# Patient Record
Sex: FEMALE | Race: WHITE | Employment: OTHER | URBAN - METROPOLITAN AREA
[De-identification: names, ages, dates, MRNs, and addresses within clinical notes are randomized per-mention and may not be internally consistent; named-entity substitution may affect disease eponyms.]

---

## 2017-01-25 DIAGNOSIS — J44.9 COPD WITH ASTHMA (HCC): Primary | ICD-10-CM

## 2017-01-25 RX ORDER — ALBUTEROL SULFATE 90 UG/1
2 AEROSOL, METERED RESPIRATORY (INHALATION) EVERY 6 HOURS PRN
Qty: 1 INHALER | Refills: 0 | OUTPATIENT
Start: 2017-01-25 | End: 2018-03-27

## 2017-01-25 NOTE — TELEPHONE ENCOUNTER
1 ventolin inhaler okayed by Dr Anay Diop.   Left a message to inform patient and she needs to schedule an appointment

## 2017-01-31 ENCOUNTER — OFFICE VISIT (OUTPATIENT)
Dept: INTERNAL MEDICINE CLINIC | Facility: CLINIC | Age: 82
End: 2017-01-31

## 2017-01-31 VITALS
BODY MASS INDEX: 23.83 KG/M2 | RESPIRATION RATE: 16 BRPM | DIASTOLIC BLOOD PRESSURE: 60 MMHG | SYSTOLIC BLOOD PRESSURE: 140 MMHG | WEIGHT: 119.81 LBS | HEART RATE: 72 BPM | HEIGHT: 59.54 IN | OXYGEN SATURATION: 94 %

## 2017-01-31 DIAGNOSIS — J44.9 CHRONIC OBSTRUCTIVE PULMONARY DISEASE, UNSPECIFIED COPD TYPE (HCC): ICD-10-CM

## 2017-01-31 DIAGNOSIS — I73.9 PVD (PERIPHERAL VASCULAR DISEASE) (HCC): ICD-10-CM

## 2017-01-31 DIAGNOSIS — I47.1 PSVT (PAROXYSMAL SUPRAVENTRICULAR TACHYCARDIA) (HCC): ICD-10-CM

## 2017-01-31 DIAGNOSIS — I10 ESSENTIAL HYPERTENSION: Primary | ICD-10-CM

## 2017-01-31 LAB
ALBUMIN SERPL-MCNC: 3.6 G/DL (ref 3.5–4.8)
ALP LIVER SERPL-CCNC: 84 U/L (ref 55–142)
ALT SERPL-CCNC: 25 U/L (ref 14–54)
AST SERPL-CCNC: 23 U/L (ref 15–41)
BILIRUB SERPL-MCNC: 0.4 MG/DL (ref 0.1–2)
BUN BLD-MCNC: 24 MG/DL (ref 8–20)
CALCIUM BLD-MCNC: 10.1 MG/DL (ref 8.3–10.3)
CHLORIDE: 99 MMOL/L (ref 101–111)
CO2: 30 MMOL/L (ref 22–32)
CREAT BLD-MCNC: 1.11 MG/DL (ref 0.55–1.02)
GLUCOSE BLD-MCNC: 112 MG/DL (ref 70–99)
M PROTEIN MFR SERPL ELPH: 7.4 G/DL (ref 6.1–8.3)
POTASSIUM SERPL-SCNC: 4.6 MMOL/L (ref 3.6–5.1)
SODIUM SERPL-SCNC: 137 MMOL/L (ref 136–144)

## 2017-01-31 PROCEDURE — 36415 COLL VENOUS BLD VENIPUNCTURE: CPT | Performed by: INTERNAL MEDICINE

## 2017-01-31 PROCEDURE — 80053 COMPREHEN METABOLIC PANEL: CPT | Performed by: INTERNAL MEDICINE

## 2017-01-31 PROCEDURE — 99214 OFFICE O/P EST MOD 30 MIN: CPT | Performed by: INTERNAL MEDICINE

## 2017-01-31 NOTE — PROGRESS NOTES
Problem #1 hypertension. Patient denies headaches dizziness chest pain shortness of breath. Problem #2 chronic obstructive pulmonary disease. Patient denies shortness of breath cough or wheezing. O2 sats at rest in the 90s.   With ambulation after 4 min

## 2017-03-20 ENCOUNTER — OFFICE VISIT (OUTPATIENT)
Dept: INTERNAL MEDICINE CLINIC | Facility: CLINIC | Age: 82
End: 2017-03-20

## 2017-03-20 VITALS
SYSTOLIC BLOOD PRESSURE: 130 MMHG | BODY MASS INDEX: 24 KG/M2 | WEIGHT: 118.81 LBS | HEART RATE: 76 BPM | DIASTOLIC BLOOD PRESSURE: 70 MMHG | TEMPERATURE: 99 F | OXYGEN SATURATION: 99 % | RESPIRATION RATE: 16 BRPM

## 2017-03-20 DIAGNOSIS — J44.9 CHRONIC OBSTRUCTIVE PULMONARY DISEASE, UNSPECIFIED COPD TYPE (HCC): ICD-10-CM

## 2017-03-20 DIAGNOSIS — I73.9 PVD (PERIPHERAL VASCULAR DISEASE) (HCC): ICD-10-CM

## 2017-03-20 DIAGNOSIS — I10 ESSENTIAL HYPERTENSION: ICD-10-CM

## 2017-03-20 DIAGNOSIS — I47.1 PSVT (PAROXYSMAL SUPRAVENTRICULAR TACHYCARDIA) (HCC): ICD-10-CM

## 2017-03-20 DIAGNOSIS — Z01.818 PRE-OP EXAM: Primary | ICD-10-CM

## 2017-03-20 DIAGNOSIS — H25.9 AGE-RELATED CATARACT OF BOTH EYES, UNSPECIFIED AGE-RELATED CATARACT TYPE: ICD-10-CM

## 2017-03-20 PROCEDURE — 99214 OFFICE O/P EST MOD 30 MIN: CPT | Performed by: INTERNAL MEDICINE

## 2017-03-20 RX ORDER — OFLOXACIN 3 MG/ML
SOLUTION/ DROPS OPHTHALMIC
COMMUNITY
Start: 2017-02-20 | End: 2017-06-19

## 2017-03-20 RX ORDER — PREDNISOLONE ACETATE 10 MG/ML
SUSPENSION/ DROPS OPHTHALMIC
COMMUNITY
Start: 2017-02-20 | End: 2017-12-22

## 2017-03-20 NOTE — PROGRESS NOTES
Patient is here for preop evaluation. She has age-related cataracts. 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 She also has COPD. She denies cough shortness of breath or wheezing. Her eye blood pressure is well controlled.

## 2017-04-28 ENCOUNTER — APPOINTMENT (OUTPATIENT)
Dept: LAB | Facility: HOSPITAL | Age: 82
End: 2017-04-28
Payer: MEDICARE

## 2017-04-28 DIAGNOSIS — I10 ESSENTIAL HYPERTENSION: ICD-10-CM

## 2017-04-28 PROCEDURE — 80048 BASIC METABOLIC PNL TOTAL CA: CPT

## 2017-04-28 PROCEDURE — 93010 ELECTROCARDIOGRAM REPORT: CPT | Performed by: INTERNAL MEDICINE

## 2017-04-28 PROCEDURE — 93005 ELECTROCARDIOGRAM TRACING: CPT

## 2017-04-28 PROCEDURE — 36415 COLL VENOUS BLD VENIPUNCTURE: CPT

## 2017-04-28 NOTE — PROGRESS NOTES
Received a call from her retinal specialist Dr. Kendrick Garcia. He needs to do surgery on her. He states that her recent H&P done for cataract surgery would be sufficient. We will have this faxed to his office.

## 2017-05-01 NOTE — INTERVAL H&P NOTE
Pre-op Diagnosis: LENS FRAGMENTS RIGHT EYE    The above referenced H&P was reviewed by Karen Nichols MD on 5/1/2017, the patient was examined and no significant changes have occurred in the patient's condition since the H&P was performed.   I discussed with

## 2017-05-02 ENCOUNTER — SURGERY (OUTPATIENT)
Age: 82
End: 2017-05-02

## 2017-05-02 ENCOUNTER — HOSPITAL ENCOUNTER (OUTPATIENT)
Facility: HOSPITAL | Age: 82
Setting detail: HOSPITAL OUTPATIENT SURGERY
Discharge: HOME | End: 2017-05-02
Attending: OPHTHALMOLOGY | Admitting: OPHTHALMOLOGY
Payer: MEDICARE

## 2017-05-02 VITALS
TEMPERATURE: 98 F | OXYGEN SATURATION: 93 % | BODY MASS INDEX: 22.58 KG/M2 | HEIGHT: 60 IN | HEART RATE: 93 BPM | RESPIRATION RATE: 16 BRPM | SYSTOLIC BLOOD PRESSURE: 103 MMHG | WEIGHT: 115 LBS | DIASTOLIC BLOOD PRESSURE: 56 MMHG

## 2017-05-02 DIAGNOSIS — I10 ESSENTIAL HYPERTENSION: Primary | ICD-10-CM

## 2017-05-02 PROBLEM — H59.021 CATARACT (LENS) FRAGMENTS IN EYE FOLLOWING CATARACT SURGERY, RIGHT EYE: Status: ACTIVE | Noted: 2017-05-02

## 2017-05-02 PROCEDURE — 08B43ZZ EXCISION OF RIGHT VITREOUS, PERCUTANEOUS APPROACH: ICD-10-PCS | Performed by: OPHTHALMOLOGY

## 2017-05-02 PROCEDURE — 08DJ3ZZ EXTRACTION OF RIGHT LENS, PERCUTANEOUS APPROACH: ICD-10-PCS | Performed by: OPHTHALMOLOGY

## 2017-05-02 RX ORDER — PHENYLEPHRINE HCL 2.5 %
1 DROPS OPHTHALMIC (EYE)
Status: COMPLETED | OUTPATIENT
Start: 2017-05-02 | End: 2017-05-02

## 2017-05-02 RX ORDER — NALOXONE HYDROCHLORIDE 0.4 MG/ML
80 INJECTION, SOLUTION INTRAMUSCULAR; INTRAVENOUS; SUBCUTANEOUS AS NEEDED
Status: DISCONTINUED | OUTPATIENT
Start: 2017-05-02 | End: 2017-05-02

## 2017-05-02 RX ORDER — HYDROMORPHONE HYDROCHLORIDE 1 MG/ML
0.4 INJECTION, SOLUTION INTRAMUSCULAR; INTRAVENOUS; SUBCUTANEOUS EVERY 5 MIN PRN
Status: DISCONTINUED | OUTPATIENT
Start: 2017-05-02 | End: 2017-05-02

## 2017-05-02 RX ORDER — SODIUM CHLORIDE, SODIUM LACTATE, POTASSIUM CHLORIDE, CALCIUM CHLORIDE 600; 310; 30; 20 MG/100ML; MG/100ML; MG/100ML; MG/100ML
INJECTION, SOLUTION INTRAVENOUS CONTINUOUS
Status: DISCONTINUED | OUTPATIENT
Start: 2017-05-02 | End: 2017-05-02

## 2017-05-02 RX ORDER — MEPERIDINE HYDROCHLORIDE 25 MG/ML
12.5 INJECTION INTRAMUSCULAR; INTRAVENOUS; SUBCUTANEOUS AS NEEDED
Status: DISCONTINUED | OUTPATIENT
Start: 2017-05-02 | End: 2017-05-02

## 2017-05-02 RX ORDER — ACETAMINOPHEN 500 MG
1000 TABLET ORAL ONCE AS NEEDED
Status: COMPLETED | OUTPATIENT
Start: 2017-05-02 | End: 2017-05-02

## 2017-05-02 RX ORDER — METOCLOPRAMIDE HYDROCHLORIDE 5 MG/ML
10 INJECTION INTRAMUSCULAR; INTRAVENOUS AS NEEDED
Status: DISCONTINUED | OUTPATIENT
Start: 2017-05-02 | End: 2017-05-02

## 2017-05-02 RX ORDER — OFLOXACIN 3 MG/ML
1 SOLUTION/ DROPS OPHTHALMIC ONCE
Status: COMPLETED | OUTPATIENT
Start: 2017-05-02 | End: 2017-05-02

## 2017-05-02 RX ORDER — CYCLOPENTOLATE HYDROCHLORIDE 10 MG/ML
1 SOLUTION/ DROPS OPHTHALMIC
Status: COMPLETED | OUTPATIENT
Start: 2017-05-02 | End: 2017-05-02

## 2017-05-02 RX ORDER — FLURBIPROFEN SODIUM 0.3 MG/ML
1 SOLUTION/ DROPS OPHTHALMIC
Status: COMPLETED | OUTPATIENT
Start: 2017-05-02 | End: 2017-05-02

## 2017-05-02 RX ORDER — ONDANSETRON 2 MG/ML
4 INJECTION INTRAMUSCULAR; INTRAVENOUS AS NEEDED
Status: DISCONTINUED | OUTPATIENT
Start: 2017-05-02 | End: 2017-05-02

## 2017-05-02 RX ORDER — ATROPINE SULFATE 10 MG/ML
1 SOLUTION/ DROPS OPHTHALMIC ONCE
Status: COMPLETED | OUTPATIENT
Start: 2017-05-02 | End: 2017-05-02

## 2017-05-02 NOTE — BRIEF OP NOTE
Pre-Operative Diagnosis: LENS FRAGMENTS RIGHT EYE     Post-Operative Diagnosis: LENS FRAGMENTS RIGHT EYE     Procedure Performed:   Procedure(s):  PARS PLANA 23G VITRECTOMY,LENSECTOMY RIGHT EYE    Surgeon(s) and Role:     Td Vazquez MD - Primary

## 2017-05-02 NOTE — OR NURSING
Discharge instructions discussed with patient and daughter, verbalized understanding. Daughter states she has eye drops and is aware how to take them.

## 2017-05-03 NOTE — OPERATIVE REPORT
[facility]    PATIENT'S NAME: Rocio Reeves   ATTENDING PHYSICIAN: Nereida Sosa M.D. OPERATING PHYSICIAN: Nereida Sosa M.D.    PATIENT ACCOUNT#:   [de-identified]    [facility]  MEDICAL RECORD #:   [id number]       ANAYELI that had been given to them at great length. She has been using preoperative antibiotics to her right eye prior to her eye surgery, also with her Simbrinza or antihypertensive eye drops for this right eye.   She has also been wearing an eye shield for prot with the [facility] 83 system. This was a 23-gauge system used. Central core vitrectomy was then done and completed. Viewing posteriorly, there were some remnants that appear to be more cortex-like.   These were slowly removed piece by piece and in sections  Aft into the eye partially using a soft-tipped cannula. It also removed the small hemorrhage over the macular area from the enlarging 10 o'clock site. No new bleeding was noted and cautery was then used.   The wound was tight and also the corneal wounds wer number of surgeries to this eye.      Dictated By Nereida Sosa M.D.  d: 05/02/2017 09:40:38  t: 05/02/2017 10:41:49  [facility] [id number]  [facility]/

## 2017-06-19 PROBLEM — R23.8 EASY BRUISING: Status: ACTIVE | Noted: 2017-06-19

## 2017-06-19 PROBLEM — R23.3 EASY BRUISING: Status: ACTIVE | Noted: 2017-06-19

## 2017-06-19 PROBLEM — R41.0 CONFUSION: Status: ACTIVE | Noted: 2017-06-19

## 2017-06-19 PROBLEM — R09.89 RALES: Status: ACTIVE | Noted: 2017-06-19

## 2017-06-19 PROBLEM — R63.4 WEIGHT LOSS: Status: ACTIVE | Noted: 2017-06-19

## 2017-06-21 ENCOUNTER — TELEPHONE (OUTPATIENT)
Dept: INTERNAL MEDICINE CLINIC | Facility: CLINIC | Age: 82
End: 2017-06-21

## 2017-07-14 ENCOUNTER — OFFICE VISIT (OUTPATIENT)
Dept: INTERNAL MEDICINE CLINIC | Facility: CLINIC | Age: 82
End: 2017-07-14

## 2017-07-14 VITALS
DIASTOLIC BLOOD PRESSURE: 54 MMHG | HEART RATE: 50 BPM | BODY MASS INDEX: 22 KG/M2 | RESPIRATION RATE: 18 BRPM | WEIGHT: 112.69 LBS | OXYGEN SATURATION: 92 % | SYSTOLIC BLOOD PRESSURE: 100 MMHG | TEMPERATURE: 98 F

## 2017-07-14 DIAGNOSIS — E83.52 HYPERCALCEMIA: ICD-10-CM

## 2017-07-14 DIAGNOSIS — N18.30 CKD (CHRONIC KIDNEY DISEASE) STAGE 3, GFR 30-59 ML/MIN (HCC): Primary | ICD-10-CM

## 2017-07-14 DIAGNOSIS — R63.4 WEIGHT LOSS: ICD-10-CM

## 2017-07-14 DIAGNOSIS — R73.9 HYPERGLYCEMIA: ICD-10-CM

## 2017-07-14 LAB
EST. AVERAGE GLUCOSE BLD GHB EST-MCNC: 117 MG/DL (ref 68–126)
HBA1C MFR BLD HPLC: 5.7 % (ref ?–5.7)
PTH-INTACT SERPL-MCNC: 9.1 PG/ML (ref 11.1–79.5)

## 2017-07-14 PROCEDURE — 83036 HEMOGLOBIN GLYCOSYLATED A1C: CPT | Performed by: INTERNAL MEDICINE

## 2017-07-14 PROCEDURE — 83970 ASSAY OF PARATHORMONE: CPT | Performed by: INTERNAL MEDICINE

## 2017-07-14 PROCEDURE — 36415 COLL VENOUS BLD VENIPUNCTURE: CPT | Performed by: INTERNAL MEDICINE

## 2017-07-14 PROCEDURE — 99214 OFFICE O/P EST MOD 30 MIN: CPT | Performed by: INTERNAL MEDICINE

## 2017-07-14 NOTE — PROGRESS NOTES
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 She was at her cardiologist's office who did some blood work. Found that her kidney function is slowly deteriorating and also her calcium levels elevated.   Reviewing the blood data I also found the sug

## 2017-07-26 ENCOUNTER — OFFICE VISIT (OUTPATIENT)
Dept: INTERNAL MEDICINE CLINIC | Facility: CLINIC | Age: 82
End: 2017-07-26

## 2017-07-26 VITALS
BODY MASS INDEX: 22.64 KG/M2 | OXYGEN SATURATION: 92 % | HEART RATE: 62 BPM | HEIGHT: 59.4 IN | RESPIRATION RATE: 18 BRPM | DIASTOLIC BLOOD PRESSURE: 56 MMHG | WEIGHT: 113.81 LBS | TEMPERATURE: 98 F | SYSTOLIC BLOOD PRESSURE: 104 MMHG

## 2017-07-26 DIAGNOSIS — J30.2 CHRONIC SEASONAL ALLERGIC RHINITIS, UNSPECIFIED TRIGGER: ICD-10-CM

## 2017-07-26 DIAGNOSIS — Z98.890 STATUS POST LUMBAR LAMINECTOMY: ICD-10-CM

## 2017-07-26 DIAGNOSIS — I10 ESSENTIAL HYPERTENSION: ICD-10-CM

## 2017-07-26 DIAGNOSIS — I34.0 MITRAL VALVE INSUFFICIENCY, UNSPECIFIED ETIOLOGY: ICD-10-CM

## 2017-07-26 DIAGNOSIS — J44.9 CHRONIC OBSTRUCTIVE PULMONARY DISEASE, UNSPECIFIED COPD TYPE (HCC): ICD-10-CM

## 2017-07-26 DIAGNOSIS — I73.9 PVD (PERIPHERAL VASCULAR DISEASE) (HCC): Primary | ICD-10-CM

## 2017-07-26 DIAGNOSIS — Z00.00 ENCOUNTER FOR ANNUAL HEALTH EXAMINATION: ICD-10-CM

## 2017-07-26 DIAGNOSIS — I25.10 CORONARY ARTERY DISEASE INVOLVING NATIVE CORONARY ARTERY OF NATIVE HEART WITHOUT ANGINA PECTORIS: ICD-10-CM

## 2017-07-26 DIAGNOSIS — M48.061 LUMBAR SPINAL STENOSIS: ICD-10-CM

## 2017-07-26 PROBLEM — R09.89 RALES: Status: RESOLVED | Noted: 2017-06-19 | Resolved: 2017-07-26

## 2017-07-26 PROBLEM — R63.4 WEIGHT LOSS: Status: RESOLVED | Noted: 2017-06-19 | Resolved: 2017-07-26

## 2017-07-26 PROBLEM — H59.021 CATARACT (LENS) FRAGMENTS IN EYE FOLLOWING CATARACT SURGERY, RIGHT EYE: Status: RESOLVED | Noted: 2017-05-02 | Resolved: 2017-07-26

## 2017-07-26 PROBLEM — R41.0 CONFUSION: Status: RESOLVED | Noted: 2017-06-19 | Resolved: 2017-07-26

## 2017-07-26 PROBLEM — R23.8 EASY BRUISING: Status: RESOLVED | Noted: 2017-06-19 | Resolved: 2017-07-26

## 2017-07-26 PROBLEM — R23.3 EASY BRUISING: Status: RESOLVED | Noted: 2017-06-19 | Resolved: 2017-07-26

## 2017-07-26 PROBLEM — H25.9 AGE-RELATED CATARACT OF BOTH EYES: Status: RESOLVED | Noted: 2017-03-20 | Resolved: 2017-07-26

## 2017-07-26 PROCEDURE — G0439 PPPS, SUBSEQ VISIT: HCPCS | Performed by: INTERNAL MEDICINE

## 2017-07-26 RX ORDER — AMOXICILLIN 500 MG
1200 CAPSULE ORAL DAILY
COMMUNITY
End: 2019-03-28

## 2017-07-26 NOTE — PROGRESS NOTES
HPI:   Av Frey is a 80year old female who presents for a Medicare Subsequent Annual Wellness visit (Pt already had Initial Annual Wellness).       Her last annual assessment has been over 1 year: Annual Physical due on 04/28/2017         Patient Care Aero Soln Inhale 2 puffs into the lungs every 6 (six) hours as needed for Wheezing. metoprolol Tartrate 25 MG Oral Tab Take 1 tablet (25 mg total) by mouth 2 (two) times daily. valsartan 160 MG Oral Tab Take 1 tablet (160 mg total) by mouth once daily. exertion  CARDIOVASCULAR: denies chest pain on exertion  GI: denies abdominal pain, denies heartburn  : denies dysuria, vaginal discharge or itching, no complaint of urinary incontinence   MUSCULOSKELETAL: Hx of back pain  NEURO: denies headaches  PSYCHE nodes: Cervical, supraclavicular, and axillary nodes normal   Neurologic: Normal         SUGGESTED VACCINATIONS - Influenza, Pneumococcal, Zoster, Tetanus     Immunization History   Administered Date(s) Administered   • HIGH DOSE FLU 65 YRS AND OLDER PRSV here does not appeal to her)    How does the patient maintain a good energy level?: Appropriate Exercise    How would you describe your daily physical activity?: Light    How would you describe your current health state?: Good    How do you maintain positi last two weeks)?: Several days    PHQ-2 SCORE: 2        Advance Directives     Do you have a healthcare power of ?: Yes    Do you have a living will?: Yes     Please go to \"Cognitive Assessment\" under Medicare Assessment section in Charting, test age 33-67, age 72 and older at high risk There are no preventive care reminders to display for this patient. Update Health Maintenance if applicable    Chlamydia  Annually if high risk No results found for: CHLAMYDIA No flowsheet data found.     Screening M found.    BUN  Annually BUN (mg/dL)   Date Value   06/04/2014 20     Blood Urea Nitrogen (mg/dL)   Date Value   06/19/2017 31 (H)    No flowsheet data found. Drug Serum Conc  Annually No results found for: DIGOXIN, DIG, VALP No flowsheet data found.

## 2017-07-26 NOTE — PATIENT INSTRUCTIONS
Rebecca Le's SCREENING SCHEDULE   Tests on this list are recommended by your physician but may not be covered, or covered at this frequency, by your insurer. Please check with your insurance carrier before scheduling to verify coverage.    PREVENTATIVE Colorectal Cancer Screening  Covered up to Age 76     Colonoscopy Screen   Covered every 10 years- more often if abnormal Colonoscopy,10 Years due on 07/11/1980 Update Health Maintenance if applicable    Flex Sigmoidoscopy Screen  Covered every 5 years [address] ANTIG   Orders placed or performed in visit on 10/22/14  -FLU VACC PRSV FREE INC ANTIG   Orders placed or performed in visit on 10/16/13  -INFLUENZA VIRUS VACCINE, PRESERV FREE, >=1YEARS OF AGE   -ADMIN INFLUENZA VIRUS VAC   Orders placed or also has the State forms available on it's website for anyone to review and print using their home computer and printer. (the forms are also available in [place]  www. [employer]. org  This link also has information from the Amy [address]

## 2017-08-09 DIAGNOSIS — J44.9 COPD WITH ASTHMA (HCC): ICD-10-CM

## 2017-08-10 RX ORDER — CELECOXIB 200 MG/1
200 CAPSULE ORAL DAILY
Qty: 90 CAPSULE | Refills: 3 | Status: SHIPPED | OUTPATIENT
Start: 2017-08-10 | End: 2018-06-04

## 2017-09-26 ENCOUNTER — IMMUNIZATION (OUTPATIENT)
Dept: INTERNAL MEDICINE CLINIC | Facility: CLINIC | Age: 82
End: 2017-09-26

## 2017-09-26 PROCEDURE — 90653 IIV ADJUVANT VACCINE IM: CPT | Performed by: INTERNAL MEDICINE

## 2017-09-26 PROCEDURE — G0008 ADMIN INFLUENZA VIRUS VAC: HCPCS | Performed by: INTERNAL MEDICINE

## 2017-11-14 ENCOUNTER — OFFICE VISIT (OUTPATIENT)
Dept: INTERNAL MEDICINE CLINIC | Facility: CLINIC | Age: 82
End: 2017-11-14

## 2017-11-14 VITALS
SYSTOLIC BLOOD PRESSURE: 120 MMHG | HEART RATE: 76 BPM | BODY MASS INDEX: 23 KG/M2 | OXYGEN SATURATION: 96 % | RESPIRATION RATE: 16 BRPM | DIASTOLIC BLOOD PRESSURE: 60 MMHG | WEIGHT: 114.31 LBS

## 2017-11-14 DIAGNOSIS — H61.22 IMPACTED CERUMEN OF LEFT EAR: Primary | ICD-10-CM

## 2017-11-14 PROCEDURE — 99213 OFFICE O/P EST LOW 20 MIN: CPT | Performed by: INTERNAL MEDICINE

## 2017-11-14 NOTE — PROGRESS NOTES
Patient has crackling left ear. On examination she has cerumen impaction. She does not want us to flush out the wax. We did offer referral to see ENT but she refused that. She just prefers having D Brox.   We did tell her that she can put 5 drops in eac

## 2017-11-27 ENCOUNTER — OFFICE VISIT (OUTPATIENT)
Dept: NEPHROLOGY | Facility: CLINIC | Age: 82
End: 2017-11-27

## 2017-11-27 VITALS — SYSTOLIC BLOOD PRESSURE: 118 MMHG | WEIGHT: 112 LBS | DIASTOLIC BLOOD PRESSURE: 68 MMHG | BODY MASS INDEX: 22 KG/M2

## 2017-11-27 DIAGNOSIS — R63.4 WEIGHT LOSS: ICD-10-CM

## 2017-11-27 DIAGNOSIS — R63.0 ANOREXIA: ICD-10-CM

## 2017-11-27 DIAGNOSIS — N18.30 CKD (CHRONIC KIDNEY DISEASE) STAGE 3, GFR 30-59 ML/MIN (HCC): Primary | ICD-10-CM

## 2017-11-27 PROCEDURE — 99204 OFFICE O/P NEW MOD 45 MIN: CPT | Performed by: INTERNAL MEDICINE

## 2017-11-27 NOTE — PROGRESS NOTES
Nephrology Consult Note    REASON FOR CONSULT: CKD 3    ASSESSMENT/PLAN:        1) CKD 3- serum creatinine has been steadily climbing over the last several months and is now 1.4 mg/dL which may be indicative of a primary glomerulopathy; also consider an AN LE edema  Denies skin rashes/myalgias/arthralgias    PMH:  Past Medical History:   Diagnosis Date   • Anesthesia complication     NEEDED O2 DURING RECOVERY    • Arrhythmia     controlled   • Back problem     L4-5 surgery   • CAD (coronary artery disease) 2 Mometasone Furo-Formoterol Fum (DULERA) 200-5 MCG/ACT Inhalation Aerosol Inhale 2 puffs two times daily Disp: 3 Inhaler Rfl: 3   Omega-3 Fatty Acids (FISH OIL) 1200 MG Oral Cap Take 1,200 mg by mouth daily.  Disp:  Rfl:    aspirin 81 MG Oral Tab Take 81 m Narrative                    2 kids          lives at [facility]          quit tob 1996          1 EtOH/day          no drugs          retired          Family History:  Family History   Problem Relation Age of Onset   • Cancer Neg    •

## 2017-12-22 ENCOUNTER — HOSPITAL ENCOUNTER (OUTPATIENT)
Dept: ULTRASOUND IMAGING | Facility: HOSPITAL | Age: 82
Discharge: HOME | End: 2017-12-22
Attending: INTERNAL MEDICINE
Payer: MEDICARE

## 2017-12-22 ENCOUNTER — LAB ENCOUNTER (OUTPATIENT)
Dept: LAB | Facility: HOSPITAL | Age: 82
End: 2017-12-22
Attending: INTERNAL MEDICINE
Payer: MEDICARE

## 2017-12-22 DIAGNOSIS — E83.52 HYPERCALCEMIA: ICD-10-CM

## 2017-12-22 DIAGNOSIS — R63.4 WEIGHT LOSS: ICD-10-CM

## 2017-12-22 DIAGNOSIS — N18.30 CKD (CHRONIC KIDNEY DISEASE) STAGE 3, GFR 30-59 ML/MIN (HCC): ICD-10-CM

## 2017-12-22 DIAGNOSIS — R63.0 ANOREXIA: ICD-10-CM

## 2017-12-22 DIAGNOSIS — N18.2 CHRONIC RENAL IMPAIRMENT, STAGE 2 (MILD): ICD-10-CM

## 2017-12-22 DIAGNOSIS — I10 ESSENTIAL HYPERTENSION: ICD-10-CM

## 2017-12-22 PROCEDURE — 36415 COLL VENOUS BLD VENIPUNCTURE: CPT

## 2017-12-22 PROCEDURE — 84156 ASSAY OF PROTEIN URINE: CPT

## 2017-12-22 PROCEDURE — 76770 US EXAM ABDO BACK WALL COMP: CPT | Performed by: INTERNAL MEDICINE

## 2017-12-22 PROCEDURE — 80053 COMPREHEN METABOLIC PANEL: CPT

## 2017-12-22 PROCEDURE — 87086 URINE CULTURE/COLONY COUNT: CPT

## 2017-12-22 PROCEDURE — 86038 ANTINUCLEAR ANTIBODIES: CPT

## 2017-12-22 PROCEDURE — 86160 COMPLEMENT ANTIGEN: CPT

## 2017-12-22 PROCEDURE — 81001 URINALYSIS AUTO W/SCOPE: CPT

## 2017-12-22 PROCEDURE — 83883 ASSAY NEPHELOMETRY NOT SPEC: CPT

## 2017-12-22 PROCEDURE — 83516 IMMUNOASSAY NONANTIBODY: CPT

## 2017-12-22 PROCEDURE — 83876 ASSAY MYELOPEROXIDASE: CPT

## 2017-12-22 PROCEDURE — 82570 ASSAY OF URINE CREATININE: CPT

## 2017-12-22 PROCEDURE — 86255 FLUORESCENT ANTIBODY SCREEN: CPT

## 2017-12-22 PROCEDURE — 84165 PROTEIN E-PHORESIS SERUM: CPT

## 2017-12-22 PROCEDURE — 86334 IMMUNOFIX E-PHORESIS SERUM: CPT

## 2018-01-22 ENCOUNTER — OFFICE VISIT (OUTPATIENT)
Dept: NEPHROLOGY | Facility: CLINIC | Age: 83
End: 2018-01-22

## 2018-01-22 VITALS — SYSTOLIC BLOOD PRESSURE: 128 MMHG | DIASTOLIC BLOOD PRESSURE: 72 MMHG | BODY MASS INDEX: 23 KG/M2 | WEIGHT: 113 LBS

## 2018-01-22 DIAGNOSIS — N18.30 CKD (CHRONIC KIDNEY DISEASE) STAGE 3, GFR 30-59 ML/MIN (HCC): Primary | ICD-10-CM

## 2018-01-22 DIAGNOSIS — N13.30 HYDRONEPHROSIS, UNSPECIFIED HYDRONEPHROSIS TYPE: ICD-10-CM

## 2018-01-22 PROCEDURE — 99214 OFFICE O/P EST MOD 30 MIN: CPT | Performed by: INTERNAL MEDICINE

## 2018-01-22 NOTE — PROGRESS NOTES
Nephrology Consult Note    REASON FOR CONSULT: CKD 3    ASSESSMENT/PLAN:        1) CKD 3- serum creatinine has increased gradually from 0.9->1.4 mg/dL over the past year which is likely due to unilateral obstruction with what appears to be moderate left hy [place] no hearing aids   • High blood pressure    • Osteoarthritis    • Pain in shoulder    • Pulmonary emphysema (HCC)    • PVD (peripheral vascular disease) ([facility] [address] 2012    vasc screening c/w PVD left leg, Dr Destiney Herring put her on Pletal.   • Raynaud's dis Disp:  Rfl:    Multiple Vitamins-Minerals (MULTI VITAMIN/MINERALS) Oral Tab Take  by mouth daily. Disp:  Rfl:    Multiple Vitamins-Minerals (OCUTABS-LUTEIN OR) Take  by mouth 2 (two) times daily.  Disp:  Rfl:    Fexofenadine HCl (ALLEGRA) 180 MG Oral Tab Ta Regular rate and rhythm, S1, S2 normal, no murmur or rub  Lungs: Clear without wheezes, rales, rhonchi. Abdomen: Soft, non-tender. + bowel sounds, no palpable organomegaly  Extremities: Without clubbing, cyanosis or edema.   Neurologic:  normal affect, c

## 2018-01-23 ENCOUNTER — TELEPHONE (OUTPATIENT)
Dept: NEPHROLOGY | Facility: CLINIC | Age: 83
End: 2018-01-23

## 2018-01-23 DIAGNOSIS — N17.9 AKI (ACUTE KIDNEY INJURY) (HCC): ICD-10-CM

## 2018-01-23 DIAGNOSIS — N13.30 HYDRONEPHROSIS, UNSPECIFIED HYDRONEPHROSIS TYPE: Primary | ICD-10-CM

## 2018-01-23 DIAGNOSIS — N18.30 CKD (CHRONIC KIDNEY DISEASE) STAGE 3, GFR 30-59 ML/MIN (HCC): ICD-10-CM

## 2018-01-24 NOTE — TELEPHONE ENCOUNTER
Left VM for daughter- reviewed US with radiology- clearly has L hydronephrosis and given recent weight loss + hyponatremia concern for malignancy (transitional cell CA, etc)- will obtain contrast abd / pelvis CT- jonas escobar

## 2018-02-09 ENCOUNTER — APPOINTMENT (OUTPATIENT)
Dept: CT IMAGING | Facility: HOSPITAL | Age: 83
End: 2018-02-09
Attending: EMERGENCY MEDICINE
Payer: MEDICARE

## 2018-02-09 ENCOUNTER — APPOINTMENT (OUTPATIENT)
Dept: GENERAL RADIOLOGY | Facility: HOSPITAL | Age: 83
End: 2018-02-09
Attending: EMERGENCY MEDICINE
Payer: MEDICARE

## 2018-02-09 ENCOUNTER — HOSPITAL ENCOUNTER (OUTPATIENT)
Facility: HOSPITAL | Age: 83
Setting detail: OBSERVATION
Discharge: HOME HEALTH | End: 2018-02-10
Attending: EMERGENCY MEDICINE | Admitting: HOSPITALIST
Payer: MEDICARE

## 2018-02-09 DIAGNOSIS — I47.1 PSVT (PAROXYSMAL SUPRAVENTRICULAR TACHYCARDIA) (HCC): ICD-10-CM

## 2018-02-09 DIAGNOSIS — R55 SYNCOPE, NEAR: Primary | ICD-10-CM

## 2018-02-09 DIAGNOSIS — I73.9 PVD (PERIPHERAL VASCULAR DISEASE) (HCC): ICD-10-CM

## 2018-02-09 DIAGNOSIS — I10 ESSENTIAL HYPERTENSION WITH GOAL BLOOD PRESSURE LESS THAN 140/90: ICD-10-CM

## 2018-02-09 DIAGNOSIS — I25.10 CORONARY ARTERY DISEASE INVOLVING NATIVE CORONARY ARTERY OF NATIVE HEART WITHOUT ANGINA PECTORIS: ICD-10-CM

## 2018-02-09 PROBLEM — D64.9 ANEMIA: Status: ACTIVE | Noted: 2018-02-09

## 2018-02-09 PROBLEM — R79.89 AZOTEMIA: Status: ACTIVE | Noted: 2018-02-09

## 2018-02-09 PROBLEM — R73.9 HYPERGLYCEMIA: Status: ACTIVE | Noted: 2018-02-09

## 2018-02-09 LAB
ALBUMIN SERPL-MCNC: 3.3 G/DL (ref 3.5–4.8)
ALP LIVER SERPL-CCNC: 98 U/L (ref 55–142)
ALT SERPL-CCNC: 18 U/L (ref 14–54)
AST SERPL-CCNC: 16 U/L (ref 15–41)
ATRIAL RATE: 62 BPM
BASOPHILS # BLD AUTO: 0.04 X10(3) UL (ref 0–0.1)
BASOPHILS NFR BLD AUTO: 0.6 %
BILIRUB SERPL-MCNC: 0.3 MG/DL (ref 0.1–2)
BUN BLD-MCNC: 36 MG/DL (ref 8–20)
CALCIUM BLD-MCNC: 9.3 MG/DL (ref 8.3–10.3)
CHLORIDE: 106 MMOL/L (ref 101–111)
CO2: 25 MMOL/L (ref 22–32)
CREAT BLD-MCNC: 1.33 MG/DL (ref 0.55–1.02)
EOSINOPHIL # BLD AUTO: 0.15 X10(3) UL (ref 0–0.3)
EOSINOPHIL NFR BLD AUTO: 2.2 %
ERYTHROCYTE [DISTWIDTH] IN BLOOD BY AUTOMATED COUNT: 12.4 % (ref 11.5–16)
GLUCOSE BLD-MCNC: 115 MG/DL (ref 70–99)
HCT VFR BLD AUTO: 32.2 % (ref 34–50)
HGB BLD-MCNC: 10.8 G/DL (ref 12–16)
IMMATURE GRANULOCYTE COUNT: 0.02 X10(3) UL (ref 0–1)
IMMATURE GRANULOCYTE RATIO %: 0.3 %
LYMPHOCYTES # BLD AUTO: 0.9 X10(3) UL (ref 0.9–4)
LYMPHOCYTES NFR BLD AUTO: 12.9 %
M PROTEIN MFR SERPL ELPH: 6.8 G/DL (ref 6.1–8.3)
MCH RBC QN AUTO: 33.6 PG (ref 27–33.2)
MCHC RBC AUTO-ENTMCNC: 33.5 G/DL (ref 31–37)
MCV RBC AUTO: 100.3 FL (ref 81–100)
MONOCYTES # BLD AUTO: 0.81 X10(3) UL (ref 0.1–1)
MONOCYTES NFR BLD AUTO: 11.6 %
NEUTROPHIL ABS PRELIM: 5.05 X10 (3) UL (ref 1.3–6.7)
NEUTROPHILS # BLD AUTO: 5.05 X10(3) UL (ref 1.3–6.7)
NEUTROPHILS NFR BLD AUTO: 72.4 %
P AXIS: 78 DEGREES
P-R INTERVAL: 156 MS
PLATELET # BLD AUTO: 229 10(3)UL (ref 150–450)
POTASSIUM SERPL-SCNC: 4.7 MMOL/L (ref 3.6–5.1)
Q-T INTERVAL: 402 MS
QRS DURATION: 76 MS
QTC CALCULATION (BEZET): 408 MS
R AXIS: 0 DEGREES
RBC # BLD AUTO: 3.21 X10(6)UL (ref 3.8–5.1)
RED CELL DISTRIBUTION WIDTH-SD: 45.9 FL (ref 35.1–46.3)
SODIUM SERPL-SCNC: 138 MMOL/L (ref 136–144)
T AXIS: 30 DEGREES
TROPONIN: <0.046 NG/ML (ref ?–0.05)
VENTRICULAR RATE: 62 BPM
WBC # BLD AUTO: 7 X10(3) UL (ref 4–13)

## 2018-02-09 PROCEDURE — 99220 INITIAL OBSERVATION CARE,LEVL III: CPT | Performed by: HOSPITALIST

## 2018-02-09 PROCEDURE — 71045 X-RAY EXAM CHEST 1 VIEW: CPT | Performed by: EMERGENCY MEDICINE

## 2018-02-09 PROCEDURE — 70450 CT HEAD/BRAIN W/O DYE: CPT | Performed by: EMERGENCY MEDICINE

## 2018-02-09 RX ORDER — ASPIRIN 81 MG/1
81 TABLET, CHEWABLE ORAL DAILY
Status: DISCONTINUED | OUTPATIENT
Start: 2018-02-09 | End: 2018-02-10

## 2018-02-09 RX ORDER — AMLODIPINE BESYLATE 5 MG/1
5 TABLET ORAL DAILY
Status: DISCONTINUED | OUTPATIENT
Start: 2018-02-10 | End: 2018-02-10

## 2018-02-09 RX ORDER — ACETAMINOPHEN 325 MG/1
650 TABLET ORAL EVERY 6 HOURS PRN
Status: DISCONTINUED | OUTPATIENT
Start: 2018-02-09 | End: 2018-02-10

## 2018-02-09 RX ORDER — CELECOXIB 200 MG/1
200 CAPSULE ORAL DAILY
Status: DISCONTINUED | OUTPATIENT
Start: 2018-02-09 | End: 2018-02-10

## 2018-02-09 RX ORDER — ONDANSETRON 2 MG/ML
4 INJECTION INTRAMUSCULAR; INTRAVENOUS EVERY 4 HOURS PRN
Status: DISCONTINUED | OUTPATIENT
Start: 2018-02-09 | End: 2018-02-09

## 2018-02-09 RX ORDER — LOSARTAN POTASSIUM 100 MG/1
100 TABLET ORAL DAILY
Status: DISCONTINUED | OUTPATIENT
Start: 2018-02-09 | End: 2018-02-10

## 2018-02-09 RX ORDER — ALBUTEROL SULFATE 90 UG/1
2 AEROSOL, METERED RESPIRATORY (INHALATION) EVERY 6 HOURS PRN
Status: DISCONTINUED | OUTPATIENT
Start: 2018-02-09 | End: 2018-02-10

## 2018-02-09 RX ORDER — HEPARIN SODIUM 5000 [USP'U]/ML
5000 INJECTION, SOLUTION INTRAVENOUS; SUBCUTANEOUS EVERY 12 HOURS SCHEDULED
Status: DISCONTINUED | OUTPATIENT
Start: 2018-02-09 | End: 2018-02-10

## 2018-02-09 RX ORDER — HYDRALAZINE HYDROCHLORIDE 20 MG/ML
5 INJECTION INTRAMUSCULAR; INTRAVENOUS EVERY 6 HOURS PRN
Status: DISCONTINUED | OUTPATIENT
Start: 2018-02-09 | End: 2018-02-10

## 2018-02-09 RX ORDER — ONDANSETRON 2 MG/ML
4 INJECTION INTRAMUSCULAR; INTRAVENOUS EVERY 6 HOURS PRN
Status: DISCONTINUED | OUTPATIENT
Start: 2018-02-09 | End: 2018-02-10

## 2018-02-09 NOTE — ED PROVIDER NOTES
Patient Seen in: [facility] Emergency Department    History   Patient presents with:  Dizziness (neurologic)  Fatigue (constitutional, neurologic)    Stated Complaint: dizziness/generalized weakness    HPI    80-year-old female brought here by EMS p Bilateral  No date: REVISE THUMB TENDON,OTHR Right  No date: TONSILLECTOMY        Smoking status: Former Smoker                                                              Packs/day: 1.00      Years: 50.00        Quit date: 8/24/1995  Smokeless tobacco: N clubbing. Pulses are +2. Full range of motion is noted of the extremities without deformities. No tenderness. Neurologically intact.          ED Course     Labs Reviewed   COMP METABOLIC PANEL (14) - Abnormal; Notable for the following:        Result Va 1349  ------------------------------------------------------------       MDM     The patient's x-ray shows COPD findings a small left pleural effusion. No acute findings on CAT scan of the brain. Cerebral atrophy with small vessel changes.   Troponin was

## 2018-02-09 NOTE — ED NOTES
Patient return from CT with no complications. Patient awake alert and oriented at this time. No acute distress noted, denies any pain. Patient reports \"I just feel sleepy. \" Will continue to monitor.

## 2018-02-09 NOTE — H&P
[facility] HOSPITALIST  History and Physical     Tyshawn Le Patient Status:  Emergency    1930 MRN [id number]   Location [facility] Attending Christian Mckenzie MD   Hosp Day # 0 PCP Carol Fields MD     Chief Complain PL fusion with poss                inst and poss PLIF 7/17/14  No date: CATARACT Bilateral  No date: REVISE THUMB TENDON,OTHR Right  No date: TONSILLECTOMY    Social History:  reports that she quit smoking about 22 years ago.  She has a 50.00 pack-year smok (OCUTABS-LUTEIN OR) Take  by mouth 2 (two) times daily. Disp:  Rfl:    Fexofenadine HCl (ALLEGRA) 180 MG Oral Tab Take 180 mg by mouth daily. Disp:  Rfl:    valsartan 160 MG Oral Tab Take 1 tablet (160 mg total) by mouth once daily.  Disp: 90 tablet Rfl: 3 hours. Recent Labs   Lab  02/09/18   1207   TROP  <0.046       Imaging: Imaging data reviewed in Epic. ASSESSMENT / PLAN:     1.  Dizziness:  Resolved, check ua, orthostatics negative, 24 hour tele, consult cardiology, pt eval.  2. HTN:  Jayshree Norton

## 2018-02-09 NOTE — ED NOTES
Report given to [facility] [place], RN at this time.

## 2018-02-09 NOTE — CONSULTS
[facility]  Report of Consultation    Desiree Le Patient Status:  Observation    1930 MRN [id number]   [facility] 7NE-A Attending Kiesha Cade MD   Hosp Day # 0 PCP Katrina Dick MD     Reason for Consultation:  Yanira Robert lasted-thinks she was feeling better when the medics were there and dizziness completely resolved en route to ER.     Pertinent Cardiac History:        CAD-echo 2012 with distal anteroseptal infarct        Hypertension, white coat Hypertension        PSVT to focus, unable to get out of bed  Sulfa Antibiotics           Comment: In childhood - doesn't know reaction    Medications:    Current Facility-Administered Medications:   •  Albuterol Sulfate  (90 Base) MCG/ACT inhaler 2 puff, 2 puff, Inhalation, acute findings  CXR: COPD, small left pl eff    Labs:     Recent Labs   02/09/18  1207   WBC 7.0   HGB 10.8*   .3*   .0       Recent Labs   02/09/18  1207      K 4.7      CO2 25.0   BUN 36*   CREATSERUM 1.33*   CA 9.3       Recent

## 2018-02-09 NOTE — ED NOTES
Patient refusing to eat dinner tray provided from cafeteria. Patient encouraged to eat peaches from tray.

## 2018-02-09 NOTE — ED INITIAL ASSESSMENT (HPI)
Pt presents to the ED via EMS from [facility] with complaints of sudden onset feeling weak and dizzy. Pt was ambulating at the time. Staff at [facility] state pt appeared slightly clammy and pale at the time, so EMS was called.  Pt states she feels tired

## 2018-02-09 NOTE — ED NOTES
Pt with small amount 0.9NS infused prior to arrival. Fluids d/c'd at this time and will await further MD orders.

## 2018-02-09 NOTE — ED NOTES
Pt awake and alert, appears comfortable. pt in position of comfort on cart. Pt in no obvious distress at this time.

## 2018-02-09 NOTE — PLAN OF CARE
Assumed care 1600. Patient alert and oriented. Denies any dizziness. Vitals stable, NSR per tele. Up with standby assist. Patient resting comfortably. Will continue to monitor.        Patient/Family Goals    • Patient/Family Long Term Goal Progressing    •

## 2018-02-10 VITALS
WEIGHT: 113 LBS | TEMPERATURE: 97 F | DIASTOLIC BLOOD PRESSURE: 93 MMHG | HEIGHT: 62 IN | RESPIRATION RATE: 18 BRPM | HEART RATE: 87 BPM | BODY MASS INDEX: 20.8 KG/M2 | SYSTOLIC BLOOD PRESSURE: 147 MMHG | OXYGEN SATURATION: 96 %

## 2018-02-10 LAB
BILIRUB UR QL STRIP.AUTO: NEGATIVE
BUN BLD-MCNC: 27 MG/DL (ref 8–20)
CALCIUM BLD-MCNC: 9.1 MG/DL (ref 8.3–10.3)
CHLORIDE: 110 MMOL/L (ref 101–111)
CLARITY UR REFRACT.AUTO: CLEAR
CO2: 23 MMOL/L (ref 22–32)
COLOR UR AUTO: YELLOW
CREAT BLD-MCNC: 1.02 MG/DL (ref 0.55–1.02)
GLUCOSE BLD-MCNC: 100 MG/DL (ref 70–99)
GLUCOSE UR STRIP.AUTO-MCNC: NEGATIVE MG/DL
KETONES UR STRIP.AUTO-MCNC: NEGATIVE MG/DL
LEUKOCYTE ESTERASE UR QL STRIP.AUTO: NEGATIVE
NITRITE UR QL STRIP.AUTO: NEGATIVE
PH UR STRIP.AUTO: 6 [PH] (ref 4.5–8)
POTASSIUM SERPL-SCNC: 4.3 MMOL/L (ref 3.6–5.1)
PROT UR STRIP.AUTO-MCNC: NEGATIVE MG/DL
RBC UR QL AUTO: NEGATIVE
SODIUM SERPL-SCNC: 140 MMOL/L (ref 136–144)
SP GR UR STRIP.AUTO: 1.01 (ref 1–1.03)
UROBILINOGEN UR STRIP.AUTO-MCNC: <2 MG/DL

## 2018-02-10 PROCEDURE — 99217 OBSERVATION CARE DISCHARGE: CPT | Performed by: HOSPITALIST

## 2018-02-10 RX ORDER — VALSARTAN 160 MG/1
160 TABLET ORAL 2 TIMES DAILY
Qty: 60 TABLET | Refills: 3 | Status: SHIPPED | OUTPATIENT
Start: 2018-02-10 | End: 2018-04-06

## 2018-02-10 NOTE — PROGRESS NOTES
Patient without complaints, feels back to baseline, some ectopy on monitor. Ambulate today, home if ok with Dr. Naeem Wang.   ?resume metoprolol, ?hold norvasc    Samson Adrian MD  [facility]

## 2018-02-10 NOTE — CM/SW NOTE
Order for home health care received. Home care liaison to assess patient for home healthcare.  sw to continue to follow    [facility]

## 2018-02-10 NOTE — PLAN OF CARE
NURSING DISCHARGE NOTE    Discharged Home via Wheelchair. Accompanied by Family member  Belongings Taken by patient/family. PIV and tele dc'd  Education provided on medication changes and follow up appointments  Questions and concerns addressed.  Pt a

## 2018-02-10 NOTE — HOME CARE LIAISON
DIAGNOSES AND PERTINENT MEDICAL HISTORY:  SYNCOPE    FACILITY NAME AND DC DATE:  [address] 2/10/18    BEDSIDE VISIT WITH:  LIDA MET WITH PATIENT AT BEDSIDE    SERVICES ORDERED:  RN PT    VERIFIED PATIENT ADDRESS, PHONE NUMBER AND CAREGIVER  Y    P

## 2018-02-10 NOTE — DISCHARGE SUMMARY
[facility] [place] HOSPITALIST  DISCHARGE SUMMARY     Lizet Rose Mimi Patient Status:  Observation    1930 MRN [id number]   [facility] 7NE-A Attending Marilu Vargas MD   Hosp Day # 0 PCP Esther Dietrich MD     Date of Admission: 2018  Date therapy.     Procedures during hospitalization:   • None    Incidental or significant findings and recommendations (brief descriptions):  • None    Lab/Test results pending at Discharge:   · None    Consultants:  • Dr. Ralf Miller    Discharge Medication List: Where to Get Your Medications      These medications were sent to [facility], [place] [address], [address] [phone], [address]  [place], [address] [place] [address], [address] [place] [address]    Phone:  [phone]   · valsartan 160

## 2018-02-10 NOTE — PROGRESS NOTES
[facility]  Cardiology Progress Note    Teodoro Le Patient Status:  Observation    1930 MRN [id number]   [facility] 7NE-A Attending Lena Garcia MD   Hosp Day # 0 PCP Jhonny Edmondson MD     Assessment:  dizziness-in setti Alert and oriented,  Gross motor and sensory modalities preserved  Psychiatric: Normal mood and affect  Skin: Warm and dry, no obvious rashes     MEDICATIONS:  • aspirin  81 mg Oral Daily   • celecoxib  200 mg Oral Daily   • Umeclidinium Bromide  1 puff In

## 2018-02-10 NOTE — PLAN OF CARE
Assumed care at 1. Alert and oriented x4. Telemetry monitor reading SR. No pain. Assessment remains unchanged from the beginning of shift. Call light in reach at the bedside. Will continue to monitor.     CARDIOVASCULAR - ADULT    • Maintains optimal car

## 2018-02-10 NOTE — PHYSICAL THERAPY NOTE
PHYSICAL THERAPY EVALUATION - INPATIENT     Room Number: 1148/2761-O  Evaluation Date: 2/10/2018  Type of Evaluation: Initial  Physician Order: PT Eval and Treat    Presenting Problem: dizziness  Reason for Therapy: Mobility Dysfunction and Discharge Regularly Uses: Glasses    Prior Level of [place]: Pt reports independence for household ambulation without use of assistive device. Pt uses rollator for community ambulation at modified [place]. Pt has assistance with showering 2 times/week.  Amy Guerra supervision)  Distance (ft): 100  Assistive Device: Rolling walker  Pattern: Shuffle  Stoop/Curb Assistance: Not tested  Comment : Scores based on FIM definitions per department protocol    Skilled Therapy Provided: Session approved by RN.  Pt received supi baseline and would benefit from skilled inpatient PT to address the above deficits to assist patient in returning to prior to level of function. Recommend home health PT upon discharge in order to continue to progress towards baseline independence.  Sabino

## 2018-02-10 NOTE — HOME CARE LIAISON
Referral received for home health. I met with patient, offered choice and she is agreeable to [facility] \"as long as it doesn't interfere with her showers that she pays for and since Medicare will cover it. \"  Thank you for the referral.

## 2018-02-12 ENCOUNTER — OFFICE VISIT (OUTPATIENT)
Dept: INTERNAL MEDICINE CLINIC | Facility: CLINIC | Age: 83
End: 2018-02-12

## 2018-02-12 ENCOUNTER — TELEPHONE (OUTPATIENT)
Dept: INTERNAL MEDICINE CLINIC | Facility: CLINIC | Age: 83
End: 2018-02-12

## 2018-02-12 ENCOUNTER — PATIENT OUTREACH (OUTPATIENT)
Dept: CASE MANAGEMENT | Age: 83
End: 2018-02-12

## 2018-02-12 VITALS
WEIGHT: 114.69 LBS | DIASTOLIC BLOOD PRESSURE: 68 MMHG | SYSTOLIC BLOOD PRESSURE: 132 MMHG | HEART RATE: 92 BPM | RESPIRATION RATE: 16 BRPM | BODY MASS INDEX: 21 KG/M2 | OXYGEN SATURATION: 94 %

## 2018-02-12 DIAGNOSIS — R55 SYNCOPE, NEAR: ICD-10-CM

## 2018-02-12 DIAGNOSIS — R73.9 HYPERGLYCEMIA: ICD-10-CM

## 2018-02-12 DIAGNOSIS — R55 POSTURAL DIZZINESS WITH PRESYNCOPE: Primary | ICD-10-CM

## 2018-02-12 DIAGNOSIS — R42 POSTURAL DIZZINESS WITH PRESYNCOPE: Primary | ICD-10-CM

## 2018-02-12 DIAGNOSIS — R79.89 AZOTEMIA: Primary | ICD-10-CM

## 2018-02-12 DIAGNOSIS — Z02.9 ENCOUNTERS FOR ADMINISTRATIVE PURPOSE: ICD-10-CM

## 2018-02-12 DIAGNOSIS — D64.9 ANEMIA, UNSPECIFIED TYPE: ICD-10-CM

## 2018-02-12 PROCEDURE — 99495 TRANSJ CARE MGMT MOD F2F 14D: CPT | Performed by: INTERNAL MEDICINE

## 2018-02-12 NOTE — PROGRESS NOTES
Initial Post Discharge Follow Up   Discharge Date: 2/10/18  Contact Date: 2/12/2018    Consent Verification:  Assessment Completed With: Patient  HIPAA Verified?   Yes    Discharge Dx:  Dizziness      General:   • How have you been since your discharge f Cyanocobalamin (VITAMIN B-12) 100 MCG Oral Tab Take 100 mcg by mouth daily. Disp:  Rfl:    Multiple Vitamins-Minerals (MULTI VITAMIN/MINERALS) Oral Tab Take  by mouth daily.  Disp:  Rfl:    Multiple Vitamins-Minerals (OCUTABS-LUTEIN OR) Take  by mouth 2 Please bring a complete list of all your medications or supplements to your visit.      Feb 19, 2018 12:15 PM CST CT ABDOMEN PELVIS WITH CONTRAST with [address] [facility] [address] [facility])    Do not eat solid food 2 hours b [address] [facility] Reception Desk: Mon-Sat 10:00am-1:00pm [address]  [facility], [address] Reception Desk: Mon-Fri 8:00am-8:00pm Sat-8:00am-4:00pm [address] [place] [address] E, [address]  Drinking Instructions (included Applicable     Overall Rating: How would you rate the care you received while in the hospital?  good      Interventions by NCM:  All d/c instructions reviewed with pt. Reviewed when to call MD vs when to go to ER/call 911.   She verbalized understanding

## 2018-02-12 NOTE — PROGRESS NOTES
HPI:    Tashi Case is a 80year old female here today for hospital follow up.    She was discharged from Inpatient hospital, [facility] to Home   Admission Date: 2/9/18   Discharge Date: 2/10/18  Hospital Discharge Diagnoses (since 1/13/2018)   None of one capsule daily   Mometasone Furo-Formoterol Fum (DULERA) 200-5 MCG/ACT Inhalation Aerosol Inhale 2 puffs two times daily   Omega-3 Fatty Acids (FISH OIL) 1200 MG Oral Cap Take 1,200 mg by mouth daily.    aspirin 81 MG Oral Tab Take 81 mg by mouth antolin index is 20.98 kg/m² as calculated from the following:    Height as of 2/9/18: 62\". Weight as of this encounter: 114 lb 11.2 oz.    /68   Pulse 92   Resp 16   Wt 114 lb 11.2 oz   SpO2 94%   BMI 20.98 kg/m²   Physical Exam: Pleasant alert well orie

## 2018-02-19 ENCOUNTER — HOSPITAL ENCOUNTER (OUTPATIENT)
Dept: CT IMAGING | Facility: HOSPITAL | Age: 83
Discharge: HOME | End: 2018-02-19
Attending: INTERNAL MEDICINE
Payer: MEDICARE

## 2018-02-19 DIAGNOSIS — N13.30 HYDRONEPHROSIS, UNSPECIFIED HYDRONEPHROSIS TYPE: ICD-10-CM

## 2018-02-19 DIAGNOSIS — N17.9 AKI (ACUTE KIDNEY INJURY) (HCC): ICD-10-CM

## 2018-02-19 DIAGNOSIS — N18.30 CKD (CHRONIC KIDNEY DISEASE) STAGE 3, GFR 30-59 ML/MIN (HCC): ICD-10-CM

## 2018-02-19 PROCEDURE — 74177 CT ABD & PELVIS W/CONTRAST: CPT | Performed by: INTERNAL MEDICINE

## 2018-02-23 ENCOUNTER — TELEPHONE (OUTPATIENT)
Dept: NEPHROLOGY | Facility: CLINIC | Age: 83
End: 2018-02-23

## 2018-02-23 NOTE — TELEPHONE ENCOUNTER
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

## 2018-03-06 PROBLEM — R60.0 LOCALIZED EDEMA: Status: ACTIVE | Noted: 2018-03-06

## 2018-03-15 ENCOUNTER — HOSPITAL ENCOUNTER (OUTPATIENT)
Dept: CV DIAGNOSTICS | Facility: HOSPITAL | Age: 83
Discharge: HOME | End: 2018-03-15
Attending: INTERNAL MEDICINE
Payer: MEDICARE

## 2018-03-15 DIAGNOSIS — R60.0 LOCALIZED EDEMA: ICD-10-CM

## 2018-03-15 DIAGNOSIS — R09.89 RALES: ICD-10-CM

## 2018-03-15 DIAGNOSIS — I34.0 NON-RHEUMATIC MITRAL REGURGITATION: ICD-10-CM

## 2018-03-15 PROCEDURE — 93306 TTE W/DOPPLER COMPLETE: CPT | Performed by: INTERNAL MEDICINE

## 2018-03-19 ENCOUNTER — NURSE ONLY (OUTPATIENT)
Dept: INTERNAL MEDICINE CLINIC | Facility: CLINIC | Age: 83
End: 2018-03-19

## 2018-03-19 DIAGNOSIS — I25.10 CORONARY ARTERY DISEASE INVOLVING NATIVE CORONARY ARTERY OF NATIVE HEART WITHOUT ANGINA PECTORIS: Primary | ICD-10-CM

## 2018-03-19 DIAGNOSIS — I10 ESSENTIAL HYPERTENSION: ICD-10-CM

## 2018-03-19 LAB
BUN BLD-MCNC: 33 MG/DL (ref 8–20)
CALCIUM BLD-MCNC: 9.2 MG/DL (ref 8.3–10.3)
CHLORIDE: 105 MMOL/L (ref 101–111)
CO2: 21 MMOL/L (ref 22–32)
CREAT BLD-MCNC: 1.36 MG/DL (ref 0.55–1.02)
GLUCOSE BLD-MCNC: 108 MG/DL (ref 70–99)
POTASSIUM SERPL-SCNC: 4.3 MMOL/L (ref 3.6–5.1)
SODIUM SERPL-SCNC: 137 MMOL/L (ref 136–144)

## 2018-03-19 PROCEDURE — 80048 BASIC METABOLIC PNL TOTAL CA: CPT | Performed by: INTERNAL MEDICINE

## 2018-03-19 PROCEDURE — 36415 COLL VENOUS BLD VENIPUNCTURE: CPT | Performed by: INTERNAL MEDICINE

## 2018-03-20 ENCOUNTER — OFFICE VISIT (OUTPATIENT)
Dept: INTERNAL MEDICINE CLINIC | Facility: CLINIC | Age: 83
End: 2018-03-20

## 2018-03-20 VITALS
HEART RATE: 70 BPM | BODY MASS INDEX: 23 KG/M2 | RESPIRATION RATE: 16 BRPM | WEIGHT: 114 LBS | SYSTOLIC BLOOD PRESSURE: 128 MMHG | DIASTOLIC BLOOD PRESSURE: 60 MMHG | OXYGEN SATURATION: 98 %

## 2018-03-20 DIAGNOSIS — L03.031 CELLULITIS OF TOE OF RIGHT FOOT: ICD-10-CM

## 2018-03-20 DIAGNOSIS — B35.1 ONYCHOMYCOSIS: Primary | ICD-10-CM

## 2018-03-20 PROCEDURE — 99213 OFFICE O/P EST LOW 20 MIN: CPT | Performed by: INTERNAL MEDICINE

## 2018-03-20 RX ORDER — CEFADROXIL 500 MG/1
500 CAPSULE ORAL 2 TIMES DAILY
Qty: 10 CAPSULE | Refills: 0 | Status: SHIPPED | OUTPATIENT
Start: 2018-03-20 | End: 2018-03-27 | Stop reason: ALTCHOICE

## 2018-03-20 NOTE — PROGRESS NOTES
Patient states for last 6 weeks of pain in the right fifth toe. No history of any injury. On examination she does have onychomycosis in the right fifth toe is red and warm and tender to touch.     Impression #1 onychomycosis–Vicks VapoRub twice daily #2 c

## 2018-03-20 NOTE — PATIENT INSTRUCTIONS
Rebecca, I want to put Vicks VapoRub on the toenail of your right fifth (little) toe. The left to do this for at least 6 months. This should clear up her fungal infection.   I do think you have an infection that toe I prescribed an antibiotic for you Jamari Raymundo

## 2018-03-27 ENCOUNTER — OFFICE VISIT (OUTPATIENT)
Dept: INTERNAL MEDICINE CLINIC | Facility: CLINIC | Age: 83
End: 2018-03-27

## 2018-03-27 VITALS
OXYGEN SATURATION: 96 % | SYSTOLIC BLOOD PRESSURE: 112 MMHG | RESPIRATION RATE: 16 BRPM | HEART RATE: 100 BPM | DIASTOLIC BLOOD PRESSURE: 60 MMHG

## 2018-03-27 DIAGNOSIS — R00.0 RAPID RESTING HEART RATE: ICD-10-CM

## 2018-03-27 DIAGNOSIS — L03.031 CELLULITIS OF TOE OF RIGHT FOOT: Primary | ICD-10-CM

## 2018-03-27 DIAGNOSIS — B35.1 ONYCHOMYCOSIS: ICD-10-CM

## 2018-03-27 LAB — TSI SER-ACNC: 0.94 MIU/ML (ref 0.35–5.5)

## 2018-03-27 PROCEDURE — 36415 COLL VENOUS BLD VENIPUNCTURE: CPT | Performed by: INTERNAL MEDICINE

## 2018-03-27 PROCEDURE — 84443 ASSAY THYROID STIM HORMONE: CPT | Performed by: INTERNAL MEDICINE

## 2018-03-27 PROCEDURE — 93000 ELECTROCARDIOGRAM COMPLETE: CPT | Performed by: INTERNAL MEDICINE

## 2018-03-27 PROCEDURE — 99214 OFFICE O/P EST MOD 30 MIN: CPT | Performed by: INTERNAL MEDICINE

## 2018-03-27 NOTE — PROGRESS NOTES
Problem 1 cellulitis right fifth toe improved. Done with antibiotic problem #2 onychomycosis right fifth toe she is to put Vicks VapoRub twice a day on toenail. Problem #3 rapid heart rate. EKG performed which did show sinus tach down to 100.   It has be

## 2018-03-28 ENCOUNTER — TELEPHONE (OUTPATIENT)
Dept: INTERNAL MEDICINE CLINIC | Facility: CLINIC | Age: 83
End: 2018-03-28

## 2018-03-28 NOTE — TELEPHONE ENCOUNTER
Daughter just wanted to review visit from yesterday.     Arland Sacks does have an upcoming visit with cardiology and she will have her brother ask regarding effects of Dulera inhaler and then will maybe follow up with pulmonology - she has not been seen by pulmo

## 2018-04-12 ENCOUNTER — OFFICE VISIT (OUTPATIENT)
Dept: INTERNAL MEDICINE CLINIC | Facility: CLINIC | Age: 83
End: 2018-04-12

## 2018-04-12 VITALS
TEMPERATURE: 98 F | DIASTOLIC BLOOD PRESSURE: 62 MMHG | RESPIRATION RATE: 18 BRPM | BODY MASS INDEX: 21 KG/M2 | WEIGHT: 113.38 LBS | SYSTOLIC BLOOD PRESSURE: 122 MMHG | HEART RATE: 68 BPM

## 2018-04-12 DIAGNOSIS — R53.83 FATIGUE, UNSPECIFIED TYPE: ICD-10-CM

## 2018-04-12 DIAGNOSIS — R00.0 TACHYCARDIA: Primary | ICD-10-CM

## 2018-04-12 PROCEDURE — 85025 COMPLETE CBC W/AUTO DIFF WBC: CPT | Performed by: INTERNAL MEDICINE

## 2018-04-12 PROCEDURE — 99213 OFFICE O/P EST LOW 20 MIN: CPT | Performed by: INTERNAL MEDICINE

## 2018-04-12 PROCEDURE — 36415 COLL VENOUS BLD VENIPUNCTURE: CPT | Performed by: INTERNAL MEDICINE

## 2018-04-12 NOTE — PROGRESS NOTES
Patient states she does feel tired and fatigued. No explained weight loss weight gain no cold or heat intolerance. Has seen her cardiologist started on a beta-blocker with improvement in her tachycardia. Able to move her bowels no blood in the stool.   Ciaran File

## 2018-04-24 ENCOUNTER — TELEPHONE (OUTPATIENT)
Dept: INTERNAL MEDICINE CLINIC | Facility: CLINIC | Age: 83
End: 2018-04-24

## 2018-04-24 NOTE — TELEPHONE ENCOUNTER
Notification received from [facility] EMT of pt fall in [employer]. -  [facility] April 20. Pt stood up from table and lost her balance. Call placed to pt on Monday, April 23 offering her an appt with Dr. Kyleigh Pope if needed. Message left on voicemail.     Akira Del Rosario

## 2018-06-04 RX ORDER — CELECOXIB 200 MG/1
200 CAPSULE ORAL DAILY
Qty: 90 CAPSULE | Refills: 3 | Status: ON HOLD | OUTPATIENT
Start: 2018-06-04 | End: 2019-01-25

## 2018-07-09 RX ORDER — ALBUTEROL SULFATE 90 UG/1
2 AEROSOL, METERED RESPIRATORY (INHALATION) EVERY 6 HOURS PRN
Qty: 1 INHALER | Refills: 0 | Status: SHIPPED | OUTPATIENT
Start: 2018-07-09 | End: 2019-03-28

## 2018-07-29 PROBLEM — B35.1 ONYCHOMYCOSIS: Status: RESOLVED | Noted: 2018-03-27 | Resolved: 2018-07-29

## 2018-07-29 PROBLEM — R00.0 RAPID RESTING HEART RATE: Status: RESOLVED | Noted: 2018-03-27 | Resolved: 2018-07-29

## 2018-07-29 PROBLEM — R55 POSTURAL DIZZINESS WITH PRESYNCOPE: Status: RESOLVED | Noted: 2018-02-12 | Resolved: 2018-07-29

## 2018-07-29 PROBLEM — R55 SYNCOPE, NEAR: Status: RESOLVED | Noted: 2018-02-09 | Resolved: 2018-07-29

## 2018-07-29 PROBLEM — L03.031 CELLULITIS OF TOE OF RIGHT FOOT: Status: RESOLVED | Noted: 2018-03-27 | Resolved: 2018-07-29

## 2018-07-29 PROBLEM — J30.2 CHRONIC SEASONAL ALLERGIC RHINITIS: Status: RESOLVED | Noted: 2017-07-26 | Resolved: 2018-07-29

## 2018-07-29 PROBLEM — R42 POSTURAL DIZZINESS WITH PRESYNCOPE: Status: RESOLVED | Noted: 2018-02-12 | Resolved: 2018-07-29

## 2018-07-29 PROBLEM — R60.0 LOCALIZED EDEMA: Status: RESOLVED | Noted: 2018-03-06 | Resolved: 2018-07-29

## 2018-07-29 PROBLEM — D64.9 ANEMIA: Status: RESOLVED | Noted: 2018-02-09 | Resolved: 2018-07-29

## 2018-07-29 PROBLEM — N18.30 CKD (CHRONIC KIDNEY DISEASE) STAGE 3, GFR 30-59 ML/MIN (HCC): Status: ACTIVE | Noted: 2018-07-29

## 2018-07-29 PROBLEM — R79.89 AZOTEMIA: Status: RESOLVED | Noted: 2018-02-09 | Resolved: 2018-07-29

## 2018-07-29 PROBLEM — R73.9 HYPERGLYCEMIA: Status: RESOLVED | Noted: 2018-02-09 | Resolved: 2018-07-29

## 2018-07-30 ENCOUNTER — OFFICE VISIT (OUTPATIENT)
Dept: INTERNAL MEDICINE CLINIC | Facility: CLINIC | Age: 83
End: 2018-07-30
Payer: MEDICARE

## 2018-07-30 VITALS
HEART RATE: 99 BPM | SYSTOLIC BLOOD PRESSURE: 128 MMHG | HEIGHT: 60.18 IN | BODY MASS INDEX: 21.76 KG/M2 | TEMPERATURE: 99 F | OXYGEN SATURATION: 97 % | RESPIRATION RATE: 16 BRPM | WEIGHT: 112.31 LBS | DIASTOLIC BLOOD PRESSURE: 58 MMHG

## 2018-07-30 DIAGNOSIS — R73.01 IFG (IMPAIRED FASTING GLUCOSE): ICD-10-CM

## 2018-07-30 DIAGNOSIS — I73.9 PVD (PERIPHERAL VASCULAR DISEASE) (HCC): Primary | ICD-10-CM

## 2018-07-30 DIAGNOSIS — I34.0 NON-RHEUMATIC MITRAL REGURGITATION: ICD-10-CM

## 2018-07-30 DIAGNOSIS — Z00.00 ENCOUNTER FOR ANNUAL HEALTH EXAMINATION: ICD-10-CM

## 2018-07-30 DIAGNOSIS — Z98.890 STATUS POST LUMBAR LAMINECTOMY: ICD-10-CM

## 2018-07-30 DIAGNOSIS — J44.9 CHRONIC OBSTRUCTIVE PULMONARY DISEASE, UNSPECIFIED COPD TYPE (HCC): ICD-10-CM

## 2018-07-30 DIAGNOSIS — N18.30 CKD (CHRONIC KIDNEY DISEASE) STAGE 3, GFR 30-59 ML/MIN (HCC): ICD-10-CM

## 2018-07-30 DIAGNOSIS — I25.10 CORONARY ARTERY DISEASE INVOLVING NATIVE CORONARY ARTERY OF NATIVE HEART WITHOUT ANGINA PECTORIS: ICD-10-CM

## 2018-07-30 DIAGNOSIS — E55.9 VITAMIN D DEFICIENCY: ICD-10-CM

## 2018-07-30 DIAGNOSIS — I10 ESSENTIAL HYPERTENSION: ICD-10-CM

## 2018-07-30 LAB
EST. AVERAGE GLUCOSE BLD GHB EST-MCNC: 111 MG/DL (ref 68–126)
HBA1C MFR BLD HPLC: 5.5 % (ref ?–5.7)
VIT D+METAB SERPL-MCNC: 34.5 NG/ML (ref 30–100)

## 2018-07-30 PROCEDURE — G0439 PPPS, SUBSEQ VISIT: HCPCS | Performed by: INTERNAL MEDICINE

## 2018-07-30 PROCEDURE — 36415 COLL VENOUS BLD VENIPUNCTURE: CPT | Performed by: INTERNAL MEDICINE

## 2018-07-30 PROCEDURE — 83036 HEMOGLOBIN GLYCOSYLATED A1C: CPT | Performed by: INTERNAL MEDICINE

## 2018-07-30 PROCEDURE — 82306 VITAMIN D 25 HYDROXY: CPT | Performed by: INTERNAL MEDICINE

## 2018-07-30 NOTE — PROGRESS NOTES
HPI:   Carl Varghese is a 80year old female who presents for a Medicare Subsequent Annual Wellness visit (Pt already had Initial Annual Wellness).     No new C/O  Her last annual assessment has been over 1 year: Annual Physical due on 07/26/2018         Fa all  Feeling down, depressed, or hopeless (over the last two weeks)?: Not at all  PHQ-2 SCORE: 0     Advanced Directive:   She does have a Living Will but we do NOT have it on file in [address] [place] [address] Rd.    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     She  has a past surgical history that includes tonsillectomy; back surgery (7/17/14); cataract (Bilat midline,mucosa normal, no drainage or sinus tenderness   Throat: Lips, mucosa, and tongue normal; teeth and gums normal   Neck: Supple, symmetrical, trachea midline, no adenopathy;  thyroid: not enlarged, symmetric, no tenderness/mass/nodules; no carotid b diet, lifestyle, and exercise. No Follow-up on file.      Christ Shipley MD, 7/30/2018     General Health     In the past six months, have you lost more than 10 pounds without trying?: 2 - No  Has your appetite been poor?: No  How does the patient m every 5 yrs age 33-67, age 72 and older at high risk There are no preventive care reminders to display for this patient. Update Health Maintenance if applicable    Chlamydia  Annually if high risk No results found for: CHLAMYDIA No flowsheet data found. (mg/dL)   Date Value   03/19/2018 1.36 (H)   06/19/2017 1.39 (H)    No flowsheet data found. Drug Serum Conc  Annually No results found for: DIGOXIN, DIG, VALP No flowsheet data found.           COPD      Spirometry Testing Annually Spirometry date:  No

## 2018-08-01 ENCOUNTER — TELEPHONE (OUTPATIENT)
Dept: INTERNAL MEDICINE CLINIC | Facility: CLINIC | Age: 83
End: 2018-08-01

## 2018-08-01 NOTE — TELEPHONE ENCOUNTER
and you told her to restart it but she cannot remember what it was - she thinks she used to take it 3 times a day.   I do not see it in your note

## 2018-08-07 ENCOUNTER — TELEPHONE (OUTPATIENT)
Dept: INTERNAL MEDICINE CLINIC | Facility: CLINIC | Age: 83
End: 2018-08-07

## 2018-09-19 ENCOUNTER — OFFICE VISIT (OUTPATIENT)
Dept: INTERNAL MEDICINE CLINIC | Facility: CLINIC | Age: 83
End: 2018-09-19
Payer: MEDICARE

## 2018-09-19 VITALS
BODY MASS INDEX: 22 KG/M2 | TEMPERATURE: 98 F | RESPIRATION RATE: 16 BRPM | WEIGHT: 111.88 LBS | SYSTOLIC BLOOD PRESSURE: 124 MMHG | HEART RATE: 72 BPM | OXYGEN SATURATION: 94 % | DIASTOLIC BLOOD PRESSURE: 70 MMHG

## 2018-09-19 DIAGNOSIS — J44.9 CHRONIC OBSTRUCTIVE PULMONARY DISEASE, UNSPECIFIED COPD TYPE (HCC): ICD-10-CM

## 2018-09-19 DIAGNOSIS — I10 ESSENTIAL HYPERTENSION: Primary | ICD-10-CM

## 2018-09-19 DIAGNOSIS — N18.30 CKD (CHRONIC KIDNEY DISEASE) STAGE 3, GFR 30-59 ML/MIN (HCC): ICD-10-CM

## 2018-09-19 DIAGNOSIS — I34.0 NON-RHEUMATIC MITRAL REGURGITATION: ICD-10-CM

## 2018-09-19 PROCEDURE — 99214 OFFICE O/P EST MOD 30 MIN: CPT | Performed by: INTERNAL MEDICINE

## 2018-09-19 PROCEDURE — G0008 ADMIN INFLUENZA VIRUS VAC: HCPCS | Performed by: INTERNAL MEDICINE

## 2018-09-19 PROCEDURE — 90653 IIV ADJUVANT VACCINE IM: CPT | Performed by: INTERNAL MEDICINE

## 2018-09-19 NOTE — PROGRESS NOTES
Problem #1 COPD patient denies any symptoms no cough shortness of breath or wheezing no FINLEY. Needs refill on Dulera.   Problem #3 chronic kidney disease stable problem #3 mitral regurgitation being monitored by her cardiologist problem #4 hypertension she

## 2018-09-24 ENCOUNTER — MED REC SCAN ONLY (OUTPATIENT)
Dept: INTERNAL MEDICINE CLINIC | Facility: CLINIC | Age: 83
End: 2018-09-24

## 2018-11-19 DIAGNOSIS — J44.9 COPD WITH ASTHMA (HCC): ICD-10-CM

## 2018-12-30 ENCOUNTER — APPOINTMENT (OUTPATIENT)
Dept: GENERAL RADIOLOGY | Facility: HOSPITAL | Age: 83
End: 2018-12-30
Payer: MEDICARE

## 2018-12-30 ENCOUNTER — HOSPITAL ENCOUNTER (EMERGENCY)
Facility: HOSPITAL | Age: 83
Discharge: HOME | End: 2018-12-30
Attending: EMERGENCY MEDICINE
Payer: MEDICARE

## 2018-12-30 VITALS
SYSTOLIC BLOOD PRESSURE: 148 MMHG | HEART RATE: 80 BPM | RESPIRATION RATE: 16 BRPM | BODY MASS INDEX: 20.82 KG/M2 | WEIGHT: 110.25 LBS | HEIGHT: 61 IN | TEMPERATURE: 99 F | DIASTOLIC BLOOD PRESSURE: 79 MMHG | OXYGEN SATURATION: 95 %

## 2018-12-30 DIAGNOSIS — S80.01XA CONTUSION OF RIGHT KNEE, INITIAL ENCOUNTER: Primary | ICD-10-CM

## 2018-12-30 PROCEDURE — 99283 EMERGENCY DEPT VISIT LOW MDM: CPT

## 2018-12-30 PROCEDURE — 73562 X-RAY EXAM OF KNEE 3: CPT | Performed by: EMERGENCY MEDICINE

## 2018-12-31 ENCOUNTER — TELEPHONE (OUTPATIENT)
Dept: INTERNAL MEDICINE CLINIC | Facility: CLINIC | Age: 83
End: 2018-12-31

## 2018-12-31 NOTE — ED INITIAL ASSESSMENT (HPI)
Patient lives at [facility] landing independent living fall tonight and large bruising noted to right knee. Denies head trauma.

## 2018-12-31 NOTE — ED PROVIDER NOTES
Patient Seen in: [facility] Emergency Department    History   Patient presents with:  Fall (musculoskeletal, neurologic)  Lower Extremity Injury (musculoskeletal)    Stated Complaint: right knee pain s/p fall    HPI    Patient is a pleasant 88-year- • REVISE THUMB TENDON,OTHR Right    • TONSILLECTOMY             Social History    Tobacco Use      Smoking status: Former Smoker        Packs/day: 1.00        Years: 50.00        Pack years: 48        Quit date: 1995        Years since quittin.3 PROCEDURE:  XR KNEE ROUTINE (3 VIEWS), RIGHT (CPT=73562)  TECHNIQUE:  Three views were obtained including patellar view. COMPARISON:  None.   INDICATIONS:  PATIENT STATED HISTORY: (As transcribed by Technologist)   Patient lives at [facility] landing independ

## 2019-01-02 ENCOUNTER — TELEPHONE (OUTPATIENT)
Dept: INTERNAL MEDICINE CLINIC | Facility: CLINIC | Age: 84
End: 2019-01-02

## 2019-01-02 NOTE — TELEPHONE ENCOUNTER
Patient states her knee is okay but does have some aches and pains. C/O of slight pain on left side when takes a deep breath once in awhile.   Appt scheduled for 1/3

## 2019-01-03 ENCOUNTER — TELEPHONE (OUTPATIENT)
Dept: INTERNAL MEDICINE CLINIC | Facility: CLINIC | Age: 84
End: 2019-01-03

## 2019-01-03 ENCOUNTER — OFFICE VISIT (OUTPATIENT)
Dept: INTERNAL MEDICINE CLINIC | Facility: CLINIC | Age: 84
End: 2019-01-03
Payer: MEDICARE

## 2019-01-03 VITALS
WEIGHT: 105.38 LBS | BODY MASS INDEX: 20 KG/M2 | RESPIRATION RATE: 18 BRPM | TEMPERATURE: 98 F | OXYGEN SATURATION: 93 % | DIASTOLIC BLOOD PRESSURE: 80 MMHG | SYSTOLIC BLOOD PRESSURE: 140 MMHG | HEART RATE: 90 BPM

## 2019-01-03 DIAGNOSIS — R42 DIZZY: ICD-10-CM
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
## 2019-01-03 DIAGNOSIS — S80.01XA CONTUSION OF RIGHT KNEE, INITIAL ENCOUNTER: ICD-10-CM

## 2019-01-03 DIAGNOSIS — R07.81 CHEST PAIN, PLEURITIC: ICD-10-CM

## 2019-01-03 PROCEDURE — 99214 OFFICE O/P EST MOD 30 MIN: CPT | Performed by: INTERNAL MEDICINE

## 2019-01-03 NOTE — PROGRESS NOTES
On 12/30/2017 patient fell. States she did not hit her head. Was unable to get up paramedics were called. They evaluated her and she states that he has to go to the emergency room however she denied.   Now she comes in because she states she feels dizzin

## 2019-01-03 NOTE — PATIENT INSTRUCTIONS
TOP FALL PREVENTION TIPS    INSIDE YOUR HOME   KITCHEN:  · Use non skid mats only. · Clean up spills as soon as they happen. · Keep objects that you use often within easy reach.   BATHROOM:  · Install grab bars on the bathroom walls beside the tub, show being treated for  Any past major health problems you've had, such as a heart attack, balance issues, hearing problems, or blood pressure problems  Anything that causes the dizziness to get worse or better  Today's exam did not show an exact cause for your on your feet as you once were. And you may have health problems you didn't have when you were younger. So, it's not surprising that older people are more likely to trip and fall. Falling can be very serious.  It can change your overall health and quality of sometimes easier said than done. But keep in mind that even small changes can make you less likely to fall. The fear of falling  It's normal to be scared of falling, especially if you've fallen before.  But being afraid can actually make you more likely to

## 2019-01-04 ENCOUNTER — TELEPHONE (OUTPATIENT)
Dept: INTERNAL MEDICINE CLINIC | Facility: CLINIC | Age: 84
End: 2019-01-04

## 2019-01-04 NOTE — TELEPHONE ENCOUNTER
Patient fell and saw Dr Joshua Stout for this however today her BP was 140/100 and this AM was 138/94. There is some crackling to the right lower lobe and bruising right below the right breast. Rib cage hurts when she breaths.  Patient does not want to see the MD

## 2019-01-07 NOTE — TELEPHONE ENCOUNTER
Call placed to pt for condition update. She states not much pain in the ribs, bruising is fading, she feels tired but resting. Appt offered and refused.

## 2019-01-22 ENCOUNTER — APPOINTMENT (OUTPATIENT)
Dept: CT IMAGING | Facility: HOSPITAL | Age: 84
DRG: 085 | End: 2019-01-22
Attending: EMERGENCY MEDICINE
Payer: MEDICARE

## 2019-01-22 ENCOUNTER — HOSPITAL ENCOUNTER (INPATIENT)
Facility: HOSPITAL | Age: 84
LOS: 4 days | Discharge: NURSING, REHAB OR LONG-TERM CARE | DRG: 085 | End: 2019-01-27
Attending: EMERGENCY MEDICINE | Admitting: HOSPITALIST
Payer: MEDICARE

## 2019-01-22 DIAGNOSIS — I60.9 SUBARACHNOID HEMORRHAGE (HCC): Primary | ICD-10-CM

## 2019-01-22 DIAGNOSIS — S00.83XA CONTUSION OF FACE, INITIAL ENCOUNTER: ICD-10-CM

## 2019-01-22 PROCEDURE — 70450 CT HEAD/BRAIN W/O DYE: CPT | Performed by: EMERGENCY MEDICINE

## 2019-01-22 PROCEDURE — 72125 CT NECK SPINE W/O DYE: CPT | Performed by: EMERGENCY MEDICINE

## 2019-01-22 PROCEDURE — 76377 3D RENDER W/INTRP POSTPROCES: CPT

## 2019-01-22 PROCEDURE — 70486 CT MAXILLOFACIAL W/O DYE: CPT | Performed by: EMERGENCY MEDICINE

## 2019-01-23 ENCOUNTER — APPOINTMENT (OUTPATIENT)
Dept: CV DIAGNOSTICS | Facility: HOSPITAL | Age: 84
DRG: 085 | End: 2019-01-23
Attending: INTERNAL MEDICINE
Payer: MEDICARE

## 2019-01-23 PROBLEM — S00.83XA CONTUSION OF FACE, INITIAL ENCOUNTER: Status: ACTIVE | Noted: 2019-01-23

## 2019-01-23 PROBLEM — I60.9 SUBARACHNOID HEMORRHAGE (HCC): Status: ACTIVE | Noted: 2019-01-23

## 2019-01-23 LAB
ALBUMIN SERPL-MCNC: 3.6 G/DL (ref 3.1–4.5)
ALBUMIN/GLOB SERPL: 0.8 {RATIO} (ref 1–2)
ALP LIVER SERPL-CCNC: 70 U/L (ref 55–142)
ALT SERPL-CCNC: 32 U/L (ref 14–54)
ANION GAP SERPL CALC-SCNC: 10 MMOL/L (ref 0–18)
ANION GAP SERPL CALC-SCNC: 9 MMOL/L (ref 0–18)
APTT PPP: 35.3 SECONDS (ref 26.1–34.6)
AST SERPL-CCNC: 43 U/L (ref 15–41)
ATRIAL RATE: 95 BPM
BASOPHILS # BLD AUTO: 0.07 X10(3) UL (ref 0–0.1)
BASOPHILS NFR BLD AUTO: 0.7 %
BILIRUB SERPL-MCNC: 1.1 MG/DL (ref 0.1–2)
BUN BLD-MCNC: 24 MG/DL (ref 8–20)
BUN BLD-MCNC: 25 MG/DL (ref 8–20)
BUN/CREAT SERPL: 27.6 (ref 10–20)
BUN/CREAT SERPL: 29.1 (ref 10–20)
CALCIUM BLD-MCNC: 9.3 MG/DL (ref 8.3–10.3)
CALCIUM BLD-MCNC: 9.6 MG/DL (ref 8.3–10.3)
CHLORIDE SERPL-SCNC: 108 MMOL/L (ref 101–111)
CHLORIDE SERPL-SCNC: 108 MMOL/L (ref 101–111)
CHOLEST SMN-MCNC: 219 MG/DL (ref ?–200)
CK SERPL-CCNC: 951 IU/L (ref 26–192)
CO2 SERPL-SCNC: 22 MMOL/L (ref 22–32)
CO2 SERPL-SCNC: 26 MMOL/L (ref 22–32)
CREAT BLD-MCNC: 0.86 MG/DL (ref 0.55–1.02)
CREAT BLD-MCNC: 0.87 MG/DL (ref 0.55–1.02)
EOSINOPHIL # BLD AUTO: 0.04 X10(3) UL (ref 0–0.3)
EOSINOPHIL NFR BLD AUTO: 0.4 %
ERYTHROCYTE [DISTWIDTH] IN BLOOD BY AUTOMATED COUNT: 11.9 % (ref 11.5–16)
GLOBULIN PLAS-MCNC: 4.3 G/DL (ref 2.8–4.4)
GLUCOSE BLD-MCNC: 105 MG/DL (ref 65–99)
GLUCOSE BLD-MCNC: 106 MG/DL (ref 70–99)
GLUCOSE BLD-MCNC: 107 MG/DL (ref 65–99)
GLUCOSE BLD-MCNC: 131 MG/DL (ref 65–99)
GLUCOSE BLD-MCNC: 97 MG/DL (ref 65–99)
GLUCOSE BLD-MCNC: 99 MG/DL (ref 70–99)
HCT VFR BLD AUTO: 37.8 % (ref 34–50)
HDLC SERPL-MCNC: 80 MG/DL (ref 40–59)
HGB BLD-MCNC: 13.4 G/DL (ref 12–16)
IMMATURE GRANULOCYTE COUNT: 0.06 X10(3) UL (ref 0–1)
IMMATURE GRANULOCYTE RATIO %: 0.6 %
INR BLD: 1.02 (ref 0.9–1.1)
LDLC SERPL CALC-MCNC: 121 MG/DL (ref ?–100)
LYMPHOCYTES # BLD AUTO: 1.04 X10(3) UL (ref 0.9–4)
LYMPHOCYTES NFR BLD AUTO: 9.8 %
M PROTEIN MFR SERPL ELPH: 7.9 G/DL (ref 6.4–8.2)
MCH RBC QN AUTO: 34.6 PG (ref 27–33.2)
MCHC RBC AUTO-ENTMCNC: 35.4 G/DL (ref 31–37)
MCV RBC AUTO: 97.7 FL (ref 81–100)
MONOCYTES # BLD AUTO: 0.91 X10(3) UL (ref 0.1–1)
MONOCYTES NFR BLD AUTO: 8.5 %
NEUTROPHIL ABS PRELIM: 8.53 X10 (3) UL (ref 1.3–6.7)
NEUTROPHILS # BLD AUTO: 8.53 X10(3) UL (ref 1.3–6.7)
NEUTROPHILS NFR BLD AUTO: 80 %
NONHDLC SERPL-MCNC: 139 MG/DL (ref ?–130)
OSMOLALITY SERPL CALC.SUM OF ELEC: 293 MOSM/KG (ref 275–295)
OSMOLALITY SERPL CALC.SUM OF ELEC: 302 MOSM/KG (ref 275–295)
P AXIS: 100 DEGREES
P-R INTERVAL: 134 MS
PLATELET # BLD AUTO: 247 10(3)UL (ref 150–450)
POTASSIUM SERPL-SCNC: 3.2 MMOL/L (ref 3.6–5.1)
POTASSIUM SERPL-SCNC: 3.3 MMOL/L (ref 3.6–5.1)
POTASSIUM SERPL-SCNC: 4.8 MMOL/L (ref 3.6–5.1)
PSA SERPL DL<=0.01 NG/ML-MCNC: 13.8 SECONDS (ref 12.4–14.7)
Q-T INTERVAL: 364 MS
QRS DURATION: 74 MS
QTC CALCULATION (BEZET): 457 MS
R AXIS: -3 DEGREES
RBC # BLD AUTO: 3.87 X10(6)UL (ref 3.8–5.1)
RED CELL DISTRIBUTION WIDTH-SD: 42.3 FL (ref 35.1–46.3)
SODIUM SERPL-SCNC: 139 MMOL/L (ref 136–144)
SODIUM SERPL-SCNC: 144 MMOL/L (ref 136–144)
T AXIS: 36 DEGREES
TRIGL SERPL-MCNC: 92 MG/DL (ref 30–149)
TROPONIN I SERPL-MCNC: <0.046 NG/ML (ref ?–0.05)
VENTRICULAR RATE: 95 BPM
VLDLC SERPL CALC-MCNC: 18 MG/DL (ref 0–30)
WBC # BLD AUTO: 10.7 X10(3) UL (ref 4–13)

## 2019-01-23 PROCEDURE — 93306 TTE W/DOPPLER COMPLETE: CPT | Performed by: INTERNAL MEDICINE

## 2019-01-23 PROCEDURE — 95816 EEG AWAKE AND DROWSY: CPT | Performed by: OTHER

## 2019-01-23 PROCEDURE — 99223 1ST HOSP IP/OBS HIGH 75: CPT | Performed by: OTHER

## 2019-01-23 PROCEDURE — 99223 1ST HOSP IP/OBS HIGH 75: CPT | Performed by: NEUROLOGICAL SURGERY

## 2019-01-23 PROCEDURE — 99223 1ST HOSP IP/OBS HIGH 75: CPT | Performed by: HOSPITALIST

## 2019-01-23 RX ORDER — SENNOSIDES 8.6 MG
17.2 TABLET ORAL NIGHTLY
Status: DISCONTINUED | OUTPATIENT
Start: 2019-01-23 | End: 2019-01-27

## 2019-01-23 RX ORDER — LABETALOL 100 MG/1
100 TABLET, FILM COATED ORAL EVERY 8 HOURS SCHEDULED
Status: DISCONTINUED | OUTPATIENT
Start: 2019-01-23 | End: 2019-01-24

## 2019-01-23 RX ORDER — ACETAMINOPHEN 325 MG/1
650 TABLET ORAL EVERY 6 HOURS PRN
Status: DISCONTINUED | OUTPATIENT
Start: 2019-01-23 | End: 2019-01-27

## 2019-01-23 RX ORDER — LORAZEPAM 2 MG/ML
1 INJECTION INTRAMUSCULAR
Status: DISCONTINUED | OUTPATIENT
Start: 2019-01-23 | End: 2019-01-27

## 2019-01-23 RX ORDER — ACETAMINOPHEN 325 MG/1
650 TABLET ORAL EVERY 4 HOURS PRN
Status: DISCONTINUED | OUTPATIENT
Start: 2019-01-23 | End: 2019-01-27

## 2019-01-23 RX ORDER — DOCUSATE SODIUM 100 MG/1
100 CAPSULE, LIQUID FILLED ORAL 2 TIMES DAILY
Status: DISCONTINUED | OUTPATIENT
Start: 2019-01-23 | End: 2019-01-27

## 2019-01-23 RX ORDER — NIMODIPINE 30 MG/1
60 CAPSULE, LIQUID FILLED ORAL
Status: DISCONTINUED | OUTPATIENT
Start: 2019-01-23 | End: 2019-01-24

## 2019-01-23 RX ORDER — BISACODYL 10 MG
10 SUPPOSITORY, RECTAL RECTAL
Status: DISCONTINUED | OUTPATIENT
Start: 2019-01-23 | End: 2019-01-27

## 2019-01-23 RX ORDER — METOCLOPRAMIDE HYDROCHLORIDE 5 MG/ML
5 INJECTION INTRAMUSCULAR; INTRAVENOUS EVERY 8 HOURS PRN
Status: DISCONTINUED | OUTPATIENT
Start: 2019-01-23 | End: 2019-01-25

## 2019-01-23 RX ORDER — POLYETHYLENE GLYCOL 3350 17 G/17G
17 POWDER, FOR SOLUTION ORAL DAILY PRN
Status: DISCONTINUED | OUTPATIENT
Start: 2019-01-23 | End: 2019-01-27

## 2019-01-23 RX ORDER — SODIUM CHLORIDE 9 MG/ML
INJECTION, SOLUTION INTRAVENOUS CONTINUOUS
Status: DISCONTINUED | OUTPATIENT
Start: 2019-01-23 | End: 2019-01-23

## 2019-01-23 RX ORDER — LABETALOL HYDROCHLORIDE 5 MG/ML
20 INJECTION, SOLUTION INTRAVENOUS ONCE
Status: COMPLETED | OUTPATIENT
Start: 2019-01-23 | End: 2019-01-23

## 2019-01-23 RX ORDER — ACETAMINOPHEN 650 MG/1
650 SUPPOSITORY RECTAL EVERY 4 HOURS PRN
Status: DISCONTINUED | OUTPATIENT
Start: 2019-01-23 | End: 2019-01-27

## 2019-01-23 RX ORDER — SODIUM PHOSPHATE, DIBASIC AND SODIUM PHOSPHATE, MONOBASIC 7; 19 G/133ML; G/133ML
1 ENEMA RECTAL ONCE AS NEEDED
Status: DISCONTINUED | OUTPATIENT
Start: 2019-01-23 | End: 2019-01-27

## 2019-01-23 RX ORDER — POTASSIUM CHLORIDE 14.9 MG/ML
20 INJECTION INTRAVENOUS ONCE
Status: COMPLETED | OUTPATIENT
Start: 2019-01-23 | End: 2019-01-23

## 2019-01-23 RX ORDER — HYDRALAZINE HYDROCHLORIDE 20 MG/ML
10 INJECTION INTRAMUSCULAR; INTRAVENOUS EVERY 6 HOURS PRN
Status: DISCONTINUED | OUTPATIENT
Start: 2019-01-23 | End: 2019-01-27

## 2019-01-23 RX ORDER — MORPHINE SULFATE 4 MG/ML
2 INJECTION, SOLUTION INTRAMUSCULAR; INTRAVENOUS EVERY 2 HOUR PRN
Status: DISCONTINUED | OUTPATIENT
Start: 2019-01-23 | End: 2019-01-27

## 2019-01-23 RX ORDER — FAMOTIDINE 10 MG/ML
20 INJECTION, SOLUTION INTRAVENOUS DAILY
Status: DISCONTINUED | OUTPATIENT
Start: 2019-01-23 | End: 2019-01-27

## 2019-01-23 RX ORDER — SODIUM CHLORIDE 9 MG/ML
INJECTION, SOLUTION INTRAVENOUS CONTINUOUS
Status: ACTIVE | OUTPATIENT
Start: 2019-01-23 | End: 2019-01-25

## 2019-01-23 RX ORDER — ONDANSETRON 2 MG/ML
4 INJECTION INTRAMUSCULAR; INTRAVENOUS EVERY 6 HOURS PRN
Status: DISCONTINUED | OUTPATIENT
Start: 2019-01-23 | End: 2019-01-27

## 2019-01-23 RX ORDER — FAMOTIDINE 20 MG/1
20 TABLET ORAL DAILY
Status: DISCONTINUED | OUTPATIENT
Start: 2019-01-23 | End: 2019-01-27

## 2019-01-23 RX ORDER — MORPHINE SULFATE 4 MG/ML
1 INJECTION, SOLUTION INTRAMUSCULAR; INTRAVENOUS EVERY 2 HOUR PRN
Status: DISCONTINUED | OUTPATIENT
Start: 2019-01-23 | End: 2019-01-27

## 2019-01-23 NOTE — CM/SW NOTE
Pt is an 79 yo female admitted for subarachnoid hemorrhage. Pt had a fall at home. Pt lives independently at [facility]. Pt is currently confused. Pt on bedrest.  PT/OT to see once off of bedrest.  SW to follow up for d/c needs.        01/23/19 150

## 2019-01-23 NOTE — H&P
[facility]  History and Physical     Chely Le Patient Status:  Emergency    1930 MRN [id number]   Location [address] [facility] Attending Rubio Toussaint MD   Hosp Day # 0 PCP Hubert Oneal MD     Chief Complaint INDIRECT/ DIRECT LASER/ IRIDEX Right 5/2/2017    Performed by Mekhi Mace MD at [facility] MAIN OR   • REVISE THUMB [address] Right    • TONSILLECTOMY         Social History:  reports that she quit smoking about 23 years ago.  She has a 50.00 pack-year smoking aspirin 81 MG Oral Tab Take 81 mg by mouth daily. Disp:  Rfl:    Cyanocobalamin (VITAMIN B-12) 100 MCG Oral Tab Take 100 mcg by mouth daily. Disp:  Rfl:    Multiple Vitamins-Minerals (MULTI VITAMIN/MINERALS) Oral Tab Take  by mouth daily.  Disp:  Rfl: --    TP  7.9   --        Estimated Creatinine Clearance: 34.1 mL/min (based on SCr of 0.86 mg/dL). No results for input(s): PTP, INR in the last 168 hours. Recent Labs   Lab  01/22/19   2241   CK  951*       Imaging: Imaging data reviewed in Epic.

## 2019-01-23 NOTE — PHYSICAL THERAPY NOTE
PT consult received. Pt is on bedrest. Will continue to follow and see when activity level upgraded per Neurosurgery.

## 2019-01-23 NOTE — CONSULTS
[address]  Neurology Consultation    Date of consult: 1/23/2019    Reason for consult: fall, face and head injury    HPI: Jd Siddiqui is a 80year old female with past medical history as listed below presents with fall and head injury, she lucia mg 4 mg Intravenous Q6H PRN   Or      Metoclopramide HCl (REGLAN) injection 5 mg 5 mg Intravenous Q8H PRN   famoTIDine (PEPCID) tab 20 mg 20 mg Oral Daily   Or      famoTIDine (PEPCID) injection 20 mg 20 mg Intravenous Daily   nimodipine (NIMOTOP) cap 60 m [facility] MAIN OR   • REVISE THUMB TENDON,OTHR Right    • TONSILLECTOMY       Social History:  Social History    Tobacco Use      Smoking status: Former Smoker        Packs/day: 1.00        Years: 50.00        Pack years: 48        Quit date: 8/24/1995        Francoise Jordan 69   GFRNAA  61   --   60   CA  9.6   --   9.3   ALB  3.6   --    --    NA  139   --   144   K   --   3.3*  3.2*   CL  108   --   108   CO2  22.0   --   26.0   ALKPHO  70   --    --    AST   --   43*   --    ALT  32   --    --    BILT  1.1   --    --    TP

## 2019-01-23 NOTE — CONSULTS
[facility]  Neurosurgery Consult    Stephanie Le Patient Status:  Inpatient    1930 MRN [id number]   [facility] 7NE-A Attending Wallace Deluna MD   Hosp Day # 0 PCP China Vazquez MD     REASON FOR CONSULTATION:  [facility]    HIST IRIDEX Right 5/2/2017    Performed by Lizzie Navarrete MD at [facility] MAIN OR   • REVISE THUMB [address] Right    • TONSILLECTOMY         FAMILY HISTORY:  family history is not on file. SOCIAL HISTORY:   reports that she quit smoking about 23 years ago.  She h (MULTI VITAMIN/MINERALS) Oral Tab Take  by mouth daily. Disp:  Rfl:  Taking   Multiple Vitamins-Minerals (OCUTABS-LUTEIN OR) Take  by mouth 2 (two) times daily. Disp:  Rfl:  Taking   Fexofenadine HCl (ALLEGRA) 180 MG Oral Tab Take 180 mg by mouth daily.  Eduard Duarte (TYLENOL) tab 650 mg 650 mg Oral Q6H PRN   hydrALAzine HCl (APRESOLINE) injection 10 mg 10 mg Intravenous Q6H PRN   potassium chloride IVPB premix 20 mEq 20 mEq Intravenous Once       REVIEW OF SYSTEMS:  A 10-point system was reviewed.   Pertinent positives Minimal high density in the left frontal lobe is likely artifactual from beam hardening artifact. Minimal subarachnoid hemorrhage cannot be excluded. A followup MRI brain exam may be   done for further evaluation.        ASSESSMENT:  This is an 80 year ol

## 2019-01-23 NOTE — PLAN OF CARE
Assumed patient care at 0730. Neuro checks per protocol- expressive aphasia, slight left tongue deviation, follows commands.  Alert to self only, intermittent agitation   SBP elevated, orders received and carried out by cardiology   Bruising noted to chin

## 2019-01-23 NOTE — ED NOTES
Spoke with Destiny Shall and updated on plan of care. She would like to be called when PT would be discharged. This RN will inform the inpatient RN on Linda's name and number. Destiny Shall also stated her daughter, Carson Haywood will be in to visit tomorrow.

## 2019-01-23 NOTE — VIDEO SWALLOW STUDY NOTE
ADULT SWALLOWING EVALUATION    ASSESSMENT    ASSESSMENT/OVERALL IMPRESSION:  Pt seen this PM for bedside swallow evaluation. RN approved session. Pt reporting pt failed RN dysphagia screening. Pt admitted to hospital s/p fall with diagnosis of SAH.  Pt has impairment     [place] no hearing aids   • High blood pressure    • Osteoarthritis    • Pain in shoulder    • Pulmonary emphysema (HCC)    • PVD (peripheral vascular disease) ([facility] [address] 2012    vasc screening c/w PVD left leg, Dr [facility] put her on Pletal.   • R clinically.  Videofluoroscopic Swallow Study is required to rule-out silent aspiration.)    Esophageal Phase of Swallow: No complaints consistent with possible esophageal involvement            GOALS  Goal #1 Video swallow study to be completed New Goal   G

## 2019-01-23 NOTE — ED INITIAL ASSESSMENT (HPI)
EMS received call from daughter in [facility] for wellness check. EMT arrived on scene with PT on floor, unknown downtime. PT disoriented, different than norm.

## 2019-01-23 NOTE — PLAN OF CARE
Received pt from ER, oriented to [address] [place] [address]. Pt alert to self, disoriented to time, place and situation. Pt unable to provide history or home med information. Pt with dark blue bruising to left chin.    Bilateral pupils reactive to light, right pupil irregul ADULT    • Achieves stable or improved neurological status Progressing    • Achieves maximal functionality and self care Progressing        Patient/Family Goals    • Patient/Family Long Term Goal Progressing    • Patient/Family Short Term Goal Progressing

## 2019-01-23 NOTE — PROCEDURES
Date of Procedure: 1/23/2019    Procedure: EEG (ELECTROENCEPHALOGRAM)     DX:SAH  HX: 80 YR OLD FEMALE WHO WAS FOUND ON THE FLOOR AT THE INDEPENDENT LIVING FACILITY WHERE SHE LIVES, BY EMS.   SHE HIT HER HEAD, AND IT IS NOT KNOW HOW LONG SHE WAS THERE  PMH:

## 2019-01-23 NOTE — OCCUPATIONAL THERAPY NOTE
Received order for OT evaluation. Pt has bedrest order. OT will evaluate the patient once the patient is evaluated by neurosurgery and the activity level is upgraded.

## 2019-01-23 NOTE — ED PROVIDER NOTES
Patient Seen in: [facility] Emergency Department    History   Patient presents with:  Fall (musculoskeletal, neurologic)    Stated Complaint:     HPI    44-year-old female sent from [facility] landing after a fall.   The patient reports that she hit her TONSILLECTOMY             Social History    Tobacco Use      Smoking status: Former Smoker        Packs/day: 1.00        Years: 50.00        Pack years: 48        Quit date: 1995        Years since quittin.4      Smokeless tobacco: Never Used the following components:    MCH 34.6 (*)     Neutrophil Absolute Prelim 8.53 (*)     Neutrophil Absolute 8.53 (*)     All other components within normal limits   CBC WITH DIFFERENTIAL WITH PLATELET    Narrative:      The following orders were created for p likely artifactual from beam hardening artifact. Minimal subarachnoid hemorrhage cannot be excluded. A followup MRI brain exam may be done for further evaluation. This critical result was discussed with Dr. Michael Fay at [address] on 1/23/2019.  Read back was Spine Cervical (cpt=72125)    PROCEDURE:  CT SPINE CERVICAL (CPT=72125)  COMPARISON:  None. INDICATIONS:  fall  TECHNIQUE:  Noncontrast CT scanning of the cervical spine is performed from the skull base through C7.   Multiplanar reconstructions are generat emphysematous changes in the lung apices are noted. Dictated by: Radha Galindo MD on 1/23/2019 at 0:10     Approved by: Radha Galindo MD                MDM   51-year-old female presents after a fall. Unknown downtime.   Labs including a CK orde

## 2019-01-24 ENCOUNTER — APPOINTMENT (OUTPATIENT)
Dept: GENERAL RADIOLOGY | Facility: HOSPITAL | Age: 84
DRG: 085 | End: 2019-01-24
Attending: INTERNAL MEDICINE
Payer: MEDICARE

## 2019-01-24 ENCOUNTER — APPOINTMENT (OUTPATIENT)
Dept: ULTRASOUND IMAGING | Facility: HOSPITAL | Age: 84
DRG: 085 | End: 2019-01-24
Attending: HOSPITALIST
Payer: MEDICARE

## 2019-01-24 ENCOUNTER — APPOINTMENT (OUTPATIENT)
Dept: CT IMAGING | Facility: HOSPITAL | Age: 84
DRG: 085 | End: 2019-01-24
Attending: NURSE PRACTITIONER
Payer: MEDICARE

## 2019-01-24 LAB
ANION GAP SERPL CALC-SCNC: 8 MMOL/L (ref 0–18)
APTT PPP: 37.3 SECONDS (ref 26.1–34.6)
BUN BLD-MCNC: 24 MG/DL (ref 8–20)
BUN/CREAT SERPL: 26.1 (ref 10–20)
CALCIUM BLD-MCNC: 8.1 MG/DL (ref 8.3–10.3)
CHLORIDE SERPL-SCNC: 118 MMOL/L (ref 101–111)
CO2 SERPL-SCNC: 23 MMOL/L (ref 22–32)
CREAT BLD-MCNC: 0.92 MG/DL (ref 0.55–1.02)
GLUCOSE BLD-MCNC: 100 MG/DL (ref 65–99)
GLUCOSE BLD-MCNC: 101 MG/DL (ref 70–99)
GLUCOSE BLD-MCNC: 114 MG/DL (ref 65–99)
GLUCOSE BLD-MCNC: 114 MG/DL (ref 65–99)
HAV IGM SER QL: 1.9 MG/DL (ref 1.8–2.5)
INR BLD: 1.1 (ref 0.9–1.1)
OSMOLALITY SERPL CALC.SUM OF ELEC: 312 MOSM/KG (ref 275–295)
POTASSIUM SERPL-SCNC: 3.8 MMOL/L (ref 3.6–5.1)
PSA SERPL DL<=0.01 NG/ML-MCNC: 14.6 SECONDS (ref 12.4–14.7)
SODIUM SERPL-SCNC: 149 MMOL/L (ref 136–144)

## 2019-01-24 PROCEDURE — 70450 CT HEAD/BRAIN W/O DYE: CPT | Performed by: NURSE PRACTITIONER

## 2019-01-24 PROCEDURE — 74230 X-RAY XM SWLNG FUNCJ C+: CPT | Performed by: INTERNAL MEDICINE

## 2019-01-24 PROCEDURE — 93886 INTRACRANIAL COMPLETE STUDY: CPT | Performed by: HOSPITALIST

## 2019-01-24 PROCEDURE — 99232 SBSQ HOSP IP/OBS MODERATE 35: CPT | Performed by: HOSPITALIST

## 2019-01-24 PROCEDURE — 99233 SBSQ HOSP IP/OBS HIGH 50: CPT | Performed by: OTHER

## 2019-01-24 RX ORDER — POTASSIUM CHLORIDE 20 MEQ/1
40 TABLET, EXTENDED RELEASE ORAL ONCE
Status: COMPLETED | OUTPATIENT
Start: 2019-01-24 | End: 2019-01-24

## 2019-01-24 NOTE — PROGRESS NOTES
[facility] Cardiology Progress Note        Jeana Le Patient Status:  Inpatient    1930 MRN [id number]   [facility] 7NE-A Attending Devyn Donato MD   Hosp Day # 1 PCP Marc Schwartz MD     Subjective: bruits. Cardiac: Regular S1S2. No S3, S4, rub, click. No murmur. Lungs: Clear to auscultation and percussion. Abdomen: Soft, non-tender. Extremities: No LE edema. No clubbing or cyanosis. Neurologic: Alert . normal affect. Skin: Warm and dry. followup MRI brain exam may be   done for further evaluation.     Impression/plan:     1. Fall: mechanical vs syncopal, with head injury, decline in MS from her baseline. Echo result would suggest ,low risk for VT/VF     2. Hypertension .   Low bp's with

## 2019-01-24 NOTE — PLAN OF CARE
Assumed patient care at 0730. More alert and oriented today, follow commands. SBP improved after 500cc bolus.    CT head, US transcranial arteries, and video swallow done   PT recommending CANDACE   Referral sent to [facility] risk precautions maint

## 2019-01-24 NOTE — PROGRESS NOTES
[id number] [facility] Neurology Progress Note    Rosibel Le Patient Status:  Inpatient    1930 MRN [id number]   [facility] 7NE-A Attending David Lin MD   Hosp Day # 1 PCP Alfredo Koch MD         Subjective:  Rosibel Schaffer of chronic small vessel ischemic disease is noted. No evidence of intracranial hemorrhage or extra-axial fluid collection. Greater than age appropriate parenchymal volume loss is noted.   Previously noted high density in the left frontal lobe is no longer and imaging, and agree with above note with following additions:  S: no new complaints  O: /90 (BP Location: Right arm)   Pulse 65   Temp 97.5 °F (36.4 °C) (Oral)   Resp 23   Ht 61\"   Wt 97 lb 14.2 oz   SpO2 95%   Breastfeeding?  No   BMI 18.50 kg/m²

## 2019-01-24 NOTE — OCCUPATIONAL THERAPY NOTE
OCCUPATIONAL THERAPY EVALUATION - INPATIENT     Room Number: 8738/8134-X  Evaluation Date: 1/24/2019  Type of Evaluation: Initial  Presenting Problem: fall, probable [address]    Physician Order: IP Consult to Occupational Therapy  Reason for Therapy: ADL/IADL D mild TR   • Visual impairment     right eye lens surgery - foreign body.   left eye cataract out w/ lens implant       Past Surgical History  Past Surgical History:   Procedure Laterality Date   • BACK SURGERY  7/17/14    L3-L4, L4-L5 lami with PL fusion wi stimulus in all quads without difficulty  Acuity:  able to read normal print without difficulty    PERCEPTION  Overall Perception Status:   WFL - within functional limits  Depth Perception:   intact    SENSATION  Light touch:  intact    Communication: crow use utensils without difficulty. Fine motor and visual perception intact. Pt did inform that pt hit her head hard last month. Patient End of Session: Up in chair;Needs met;Call light within reach;RN aware of session/findings; All patient questions and c MODERATE - Analysis of occupational profile, detailed assessments, several treatment options    Overall Complexity LOW     OT Discharge Recommendations: Sub-acute rehabilitation(12 to 14 days)       PLAN  OT Treatment Plan: Balance activities; Energy conser

## 2019-01-24 NOTE — PROGRESS NOTES
[facility]  Progress Note     Lizet Le Patient Status:  Inpatient    1930 MRN [id number]   [facility] 7NE-A Attending Gin Sharp MD   Hosp Day # 1 PCP Esther Dietrich MD     Chief Complaint: [facility]  S:  No overnigh --    TP  7.9   --    --    --    --    --     < > = values in this interval not displayed.      Recent Labs   Lab  01/23/19 0448  01/24/19   0508   PTP  13.8  14.6   INR  1.02  1.10     Recent Labs   Lab  01/22/19   2241 01/23/19 0448   TROP   --   <

## 2019-01-24 NOTE — DIETARY MALNUTRITION NOTE
[facility]    NUTRITION INITIAL ASSESSMENT    Pt meets severe malnutrition criteria.     CRITERIA FOR MALNUTRITION DIAGNOSIS:  Criteria for severe malnutrition diagnosis: chronic illness related to wt loss greater than 5% in 1 month and energy intake l oz)  09/19/18 : 50.8 kg (111 lb 14.4 oz)  07/30/18 : 50.9 kg (112 lb 4.8 oz)  06/27/18 : 49.9 kg (110 lb)      NUTRITION:  Diet: cardiac, pureed  Oral Supplements: enlive    FOOD/NUTRITION RELATED HISTORY:  Appetite: Poor  Intake: <75%  Intake Meeting Need

## 2019-01-24 NOTE — PHYSICAL THERAPY NOTE
PHYSICAL THERAPY EVALUATION - INPATIENT     Room Number: 2548/0739-H  Evaluation Date: 1/24/2019  Type of Evaluation: Initial  Physician Order: PT Eval and Treat    Presenting Problem: fall, [address]  Reason for Therapy: Mobility Dysfunction and Discharge Independent living facility   Home Layout: Multi-level                Lives With: Alone  Drives: No     Patient Regularly Uses: Glasses    Prior Level of [place]:  Pt ambulating without AD within her apartment. She uses RW to dining room for meals.  Sh CMS Modifier (G-Code): CK    FUNCTIONAL ABILITY STATUS  Gait Assessment   Gait Assistance: Dependent assistance  Distance (ft): 8  Assistive Device: Rolling walker  Pattern: Shuffle; Ataxic     Comment : based upon FIM definitions    Skilled Therapy Provi mobility; Endurance; Energy conservation;Patient education; Family education;Gait training;Neuromuscular re-educate;Stair training;Transfer training;Balance training  Rehab Potential : Good  Frequency (Obs): 5x/week  Number of Visits to Meet Established Goals

## 2019-01-24 NOTE — VIDEO SWALLOW STUDY NOTE
ADULT VIDEOFLUOROSCOPIC SWALLOWING STUDY    Admission Date: 1/22/2019  Evaluation Date: 01/24/19  Radiologist: Erin Rose    RECOMMENDATIONS   Diet Recommendations - Solids: Regular  Diet Recommendations - Liquid:  Thin    Further Follow-up:  Follow Up Needed: respiratory difficulty  Precautions: Seizure    Reason for Referral: Stroke protocol;RN dysphagia screen    Family/Patient Goals:   To eat and drink safely     ASSESSMENT   DYSPHAGIA ASSESSMENT  Test completed in conjunction with Radiologist.  Patient Posit larynx or out of the airway       Overall Impression: Pt presents with minimal oropharyngeal dysphagia characterized by decreased laryngeal elevation, decreased anterior hyoid excursion, and decreased clearance of pharyngeal residue.  Pt did demonstrate x1 Established Goals: 2    Thank you for your referral.   If you have any questions, please contact Nany Ann M.S. [phone] [facility]  Pager [phone]

## 2019-01-24 NOTE — CM/SW NOTE
sw received call from dtr who is in agreement that pt will require CANDACE. She would like referral to the [facility]. Pt is in agreement. ECIN referral made to the [facility], referral pending. Pt is inpt status as of 1/23/19. DON screen requested.  sw to follow

## 2019-01-24 NOTE — PLAN OF CARE
Assumed pt care @ 1930, pt alert to self, knows she is in the hospital, reports she came to the hospital after fall, does not know today's date. Pt able to name more objects in the picture, definitely improved than when she first came in.    IVF infusing

## 2019-01-25 ENCOUNTER — APPOINTMENT (OUTPATIENT)
Dept: CT IMAGING | Facility: HOSPITAL | Age: 84
DRG: 085 | End: 2019-01-25
Attending: HOSPITALIST
Payer: MEDICARE

## 2019-01-25 LAB
ANION GAP SERPL CALC-SCNC: 9 MMOL/L (ref 0–18)
APTT PPP: 35.5 SECONDS (ref 26.1–34.6)
BILIRUB UR QL STRIP.AUTO: NEGATIVE
BUN BLD-MCNC: 19 MG/DL (ref 8–20)
BUN/CREAT SERPL: 21.6 (ref 10–20)
CALCIUM BLD-MCNC: 7.8 MG/DL (ref 8.3–10.3)
CHLORIDE SERPL-SCNC: 117 MMOL/L (ref 101–111)
CO2 SERPL-SCNC: 20 MMOL/L (ref 22–32)
COLOR UR AUTO: YELLOW
CREAT BLD-MCNC: 0.88 MG/DL (ref 0.55–1.02)
GLUCOSE BLD-MCNC: 104 MG/DL (ref 65–99)
GLUCOSE BLD-MCNC: 117 MG/DL (ref 65–99)
GLUCOSE BLD-MCNC: 89 MG/DL (ref 70–99)
GLUCOSE BLD-MCNC: 92 MG/DL (ref 65–99)
GLUCOSE BLD-MCNC: 98 MG/DL (ref 65–99)
GLUCOSE UR STRIP.AUTO-MCNC: NEGATIVE MG/DL
HAV IGM SER QL: 1.6 MG/DL (ref 1.8–2.5)
HYALINE CASTS #/AREA URNS AUTO: PRESENT /LPF
INR BLD: 1.1 (ref 0.9–1.1)
NITRITE UR QL STRIP.AUTO: NEGATIVE
OSMOLALITY SERPL CALC.SUM OF ELEC: 304 MOSM/KG (ref 275–295)
PH UR STRIP.AUTO: 5 [PH] (ref 4.5–8)
POTASSIUM SERPL-SCNC: 3.8 MMOL/L (ref 3.6–5.1)
POTASSIUM SERPL-SCNC: 3.8 MMOL/L (ref 3.6–5.1)
PROT UR STRIP.AUTO-MCNC: NEGATIVE MG/DL
PSA SERPL DL<=0.01 NG/ML-MCNC: 14.7 SECONDS (ref 12.4–14.7)
RBC #/AREA URNS AUTO: >10 /HPF
SODIUM SERPL-SCNC: 146 MMOL/L (ref 136–144)
SP GR UR STRIP.AUTO: 1.01 (ref 1–1.03)
UROBILINOGEN UR STRIP.AUTO-MCNC: <2 MG/DL
WBC #/AREA URNS AUTO: >50 /HPF
WBC CLUMPS UR QL AUTO: PRESENT

## 2019-01-25 PROCEDURE — 70450 CT HEAD/BRAIN W/O DYE: CPT | Performed by: HOSPITALIST

## 2019-01-25 PROCEDURE — 99232 SBSQ HOSP IP/OBS MODERATE 35: CPT | Performed by: OTHER

## 2019-01-25 PROCEDURE — 99233 SBSQ HOSP IP/OBS HIGH 50: CPT | Performed by: HOSPITALIST

## 2019-01-25 RX ORDER — QUETIAPINE 25 MG/1
12.5 TABLET, FILM COATED ORAL 2 TIMES DAILY PRN
Status: DISCONTINUED | OUTPATIENT
Start: 2019-01-25 | End: 2019-01-27

## 2019-01-25 RX ORDER — POTASSIUM CHLORIDE 20 MEQ/1
40 TABLET, EXTENDED RELEASE ORAL ONCE
Status: COMPLETED | OUTPATIENT
Start: 2019-01-25 | End: 2019-01-25

## 2019-01-25 RX ORDER — HALOPERIDOL 5 MG/ML
1 INJECTION INTRAMUSCULAR ONCE
Status: COMPLETED | OUTPATIENT
Start: 2019-01-25 | End: 2019-01-25

## 2019-01-25 RX ORDER — HALOPERIDOL 5 MG/ML
1 INJECTION INTRAMUSCULAR 2 TIMES DAILY PRN
Status: DISCONTINUED | OUTPATIENT
Start: 2019-01-25 | End: 2019-01-27

## 2019-01-25 RX ORDER — CEFDINIR 300 MG/1
300 CAPSULE ORAL 2 TIMES DAILY
Qty: 6 CAPSULE | Refills: 0 | Status: SHIPPED | OUTPATIENT
Start: 2019-01-26 | End: 2019-01-29

## 2019-01-25 RX ORDER — MAGNESIUM OXIDE 400 MG (241.3 MG MAGNESIUM) TABLET
400 TABLET ONCE
Status: COMPLETED | OUTPATIENT
Start: 2019-01-25 | End: 2019-01-25

## 2019-01-25 NOTE — PROGRESS NOTES
[facility]  Cardiology Progress Note    Mary Le Patient Status:  Inpatient    1930 MRN [id number]   [facility] 7NE-A Attending Deloris Loyola MD   Hosp Day # 2 PCP Renato Ferrara MD     Assessment:  Confusion, Fall wit hepatosplenomegaly, masses  Extremities:  Peripheral pulses are  2+, no edema.   Neurologic: Alert and oriented,  Gross motor and sensory modalities preserved  Psychiatric: Normal mood and affect  Skin: Warm and dry, no obvious rashes     MEDICATIONS:  • ce

## 2019-01-25 NOTE — PLAN OF CARE
Assumed care of pt at [address]. A&Ox2-4, forgetful, more confused over night. Anxious at times. Neuro check q4h, has some mild expressive aphasia. VSS on RA. NSR on tele. Bruising to chin and extremities. Denies any pain. Up with assist x2 to bedside commode.

## 2019-01-25 NOTE — PROGRESS NOTES
01/24/19 1937   Clinical Encounter Type   Visited With Patient  (Family member at bedside)   Routine Visit (Responded to request for consult  -spiritual counseling)   Patient's Supportive Strategies/Resources  offered non anxious presence and em

## 2019-01-25 NOTE — PROGRESS NOTES
[facility]  Progress Note     Jeana Le Patient Status:  Inpatient    1930 MRN [id number]   [facility] 7NE-A Attending Devyn Donato MD   Hosp Day # 2 PCP Marc Schwartz MD     Chief Complaint: [facility]  S:  No overnigh --    --    --    AST   --    --   43*   --    --    --    --    ALT  32   --    --    --    --    --    --    BILT  1.1   --    --    --    --    --    --    TP  7.9   --    --    --    --    --    --     < > = values in this interval not displayed. expected to be discharge to: rehab     [facility], MD

## 2019-01-25 NOTE — BH REHAB NOTE
SPEECH PATHOLOGY CONTACT NOTE    Attempted to see pt for cognitive evaluation and follow up dysphagia treatment to ensure adequate toleration of recommended diet consistencies. Pt off the floor at a procedure and unavailable for therapy.   RN reported pt d

## 2019-01-25 NOTE — PROGRESS NOTES
Pt started to anxious and agitated around 4:30-5PM after pt's grand daughter left hospital for some errands. Pt was sitting at the bedside recliner at the time, pt refused to move to bed.   This RN and Charge RN attempted to deescalate pt by offering to wa

## 2019-01-25 NOTE — PHYSICAL THERAPY NOTE
PHYSICAL THERAPY TREATMENT NOTE - INPATIENT    Room Number: 5323/8672-N     Session: 1   Number of Visits to Meet Established Goals: 4    Presenting Problem: fall, SAH    Problem List  Principal Problem:    Subarachnoid hemorrhage ([facility] [address]  Active Problems: Static Sitting: Fair -  Dynamic Sitting: Poor +           Static Standing: Poor +  Dynamic Standing: Poor +    ACTIVITY TOLERANCE training;Transfer training;Balance training  Rehab Potential : Good  Frequency (Obs): 5x/week    CURRENT GOALS   ***

## 2019-01-25 NOTE — PLAN OF CARE
Assumed care @ 0700. Pt a/o x1 to self, VSS. Tele SR. No acute respiratory distress noted. Denies any pain. Pt ambulated with 1-2 person assist to the bathroom and in the hallway.     Pt was more confused, unable to answer question correctly, not l ADULT - FALL    • Free from fall injury Progressing        SKIN/TISSUE INTEGRITY - ADULT    • Skin integrity remains intact Progressing

## 2019-01-25 NOTE — PROGRESS NOTES
[id number] Lulu Luna Neurology Progress Note    Jefe Le Patient Status:  Inpatient    1930 MRN [id number]   [facility] 7NE-A Attending Crys Magaña MD   Hosp Day # 2 PCP Chacha Kaye MD     CC: Fall    Subjective:  [facility] 01/25/2019       Diagnostics:  1/24/2019 CT Brain  Sequelae of chronic small vessel ischemic disease is noted. No evidence of ICH or extra-axial fluid collection. Greater than age appropriate parenchymal volume loss is noted.    Previously noted high dens time TME in setting of UTI. Ruth Ann Bagley D.O.   Neurology

## 2019-01-26 LAB
ANION GAP SERPL CALC-SCNC: 6 MMOL/L (ref 0–18)
BUN BLD-MCNC: 15 MG/DL (ref 8–20)
BUN/CREAT SERPL: 16.9 (ref 10–20)
CALCIUM BLD-MCNC: 8.2 MG/DL (ref 8.3–10.3)
CHLORIDE SERPL-SCNC: 116 MMOL/L (ref 101–111)
CO2 SERPL-SCNC: 22 MMOL/L (ref 22–32)
CREAT BLD-MCNC: 0.89 MG/DL (ref 0.55–1.02)
GLUCOSE BLD-MCNC: 212 MG/DL (ref 65–99)
GLUCOSE BLD-MCNC: 84 MG/DL (ref 70–99)
HAV IGM SER QL: 2.1 MG/DL (ref 1.8–2.5)
OSMOLALITY SERPL CALC.SUM OF ELEC: 298 MOSM/KG (ref 275–295)
POTASSIUM SERPL-SCNC: 3.6 MMOL/L (ref 3.6–5.1)
POTASSIUM SERPL-SCNC: 3.6 MMOL/L (ref 3.6–5.1)
SODIUM SERPL-SCNC: 144 MMOL/L (ref 136–144)

## 2019-01-26 PROCEDURE — 99232 SBSQ HOSP IP/OBS MODERATE 35: CPT | Performed by: OTHER

## 2019-01-26 PROCEDURE — 99232 SBSQ HOSP IP/OBS MODERATE 35: CPT | Performed by: HOSPITALIST

## 2019-01-26 RX ORDER — LOSARTAN POTASSIUM 50 MG/1
50 TABLET ORAL DAILY
Status: DISCONTINUED | OUTPATIENT
Start: 2019-01-26 | End: 2019-01-27

## 2019-01-26 NOTE — PROGRESS NOTES
JESSICA HOSPITALIST  Progress Note     Nick Le Patient Status:  Inpatient    1930 MRN [id number]   [facility] 7NE-A Attending Merlyn Gordon MD   Hosp Day # 3 PCP Ashley Lassiter MD     Chief Complaint: [facility]  S:  Patient mor --    --    --    --    ALT  32   --    --    --    --    --    BILT  1.1   --    --    --    --    --    TP  7.9   --    --    --    --    --     < > = values in this interval not displayed.      Recent Labs   Lab  01/23/19 0448 01/24/19 0440 01/25/

## 2019-01-26 NOTE — PROGRESS NOTES
ADDENDUM:  The patient was personally seen and examined by me. I agree with the note written below with the following comments:    S: no complaints this AM.  Answers some questions inappropriately, but pleasant/not agitated.     O: /64 (BP Location: L (133-173)/(55-70) 133/55  Temp (24hrs), Av.9 °F (36.6 °C), Min:97.3 °F (36.3 °C), Max:98.5 °F (36.9 °C)      Intake/Output:  No intake or output data in the 24 hours ending 19 1150  Last 3 Weights  19 0345 : 97 lb 14.2 oz (44.4 kg)   AST   --   43*   ALB  3.6   --      No results for input(s): BNP in the last 72 hours.     Invalid input(s): TROPI  Lab Results   Component Value Date    PT 13.5 06/04/2014    INR 1.10 01/25/2019    INR 1.10 01/24/2019    INR 1.02 01/23/2019         Tele

## 2019-01-26 NOTE — PROGRESS NOTES
[id number] Lulu Luna Neurology Progress Note    Mary Le Patient Status:  Inpatient    1930 MRN [id number]   [facility] 7NE-A Attending Deloris Loyola MD   Hosp Day # 3 PCP Renato Ferrara MD     CC: Fall    Subjective:  [facility] loss is noted. Previously noted high density in the left frontal lobe is no longer identified.      1/23/2019 EEG  Unremarkable EEG without any interictal discharges, electrographic seizure activity or epileptiform activity.   Does not rule out seizure di

## 2019-01-26 NOTE — PLAN OF CARE
Assumed care at [facility] [address] only to self, anxious, agitated, trying to get out of bed  Posey vest in place  Granddaughter at bedside  Patient difficult to redirect  Refusing neuro checks  Medication given as ordered  Call light in reach  Will monitor

## 2019-01-27 VITALS
HEART RATE: 86 BPM | BODY MASS INDEX: 18.48 KG/M2 | DIASTOLIC BLOOD PRESSURE: 60 MMHG | TEMPERATURE: 98 F | HEIGHT: 61 IN | SYSTOLIC BLOOD PRESSURE: 127 MMHG | OXYGEN SATURATION: 94 % | WEIGHT: 97.88 LBS | RESPIRATION RATE: 12 BRPM

## 2019-01-27 LAB
GLUCOSE BLD-MCNC: 103 MG/DL (ref 65–99)
POTASSIUM SERPL-SCNC: 3.7 MMOL/L (ref 3.6–5.1)

## 2019-01-27 PROCEDURE — 99239 HOSP IP/OBS DSCHRG MGMT >30: CPT | Performed by: HOSPITALIST

## 2019-01-27 RX ORDER — POTASSIUM CHLORIDE 20 MEQ/1
40 TABLET, EXTENDED RELEASE ORAL ONCE
Status: COMPLETED | OUTPATIENT
Start: 2019-01-27 | End: 2019-01-27

## 2019-01-27 RX ORDER — CEFUROXIME AXETIL 250 MG/1
250 TABLET ORAL EVERY 12 HOURS SCHEDULED
Qty: 4 TABLET | Refills: 0 | Status: SHIPPED | OUTPATIENT
Start: 2019-01-27 | End: 2019-01-29

## 2019-01-27 RX ORDER — CEFUROXIME AXETIL 250 MG/1
250 TABLET ORAL EVERY 12 HOURS SCHEDULED
Status: DISCONTINUED | OUTPATIENT
Start: 2019-01-27 | End: 2019-01-27

## 2019-01-27 NOTE — CM/SW NOTE
MSW spoke with the  at the [address] [place] [address] and left a voicemail for [facility] at the [address] [place] [address] who is covering for admissions informing them of discharge today between 12 and 1pm.  MSW spoke with the patient's daughter Tobias Bueno to inform her of dc as well.

## 2019-01-27 NOTE — PLAN OF CARE
A/OX3, RA, VSS, NSR per Tele  Pt denies any pain   Neuro checks intact   Pt resting comfortably, no agitation  Needs attended to, Will cont to monitor.      CARDIOVASCULAR - ADULT    • Maintains optimal cardiac output and hemodynamic stability Progressing

## 2019-01-27 NOTE — PLAN OF CARE
Assumed care at 0700. Patient alert 2-3, denies any complaint of pain. Vitals stable, NSR per tele. Ok to discharge per all consults. Discharge instructions reviewed with patient and patient's son, verbalized understanding.  Discharge via wheelchair to priv • Patient will remain free from self-harm Adequate for Discharge        SKIN/TISSUE INTEGRITY - ADULT    • Skin integrity remains intact Adequate for Discharge

## 2019-01-27 NOTE — PROGRESS NOTES
ADDENDUM:  The patient was personally seen and examined by me. I agree with the note written below with the following comments:    S: no issues overnight, pleasant and appropriate today, no complaints.       O: /60 (BP Location: Right arm)   Pulse 86 Objective:         Temp:  [97.5 °F (36.4 °C)-98.3 °F (36.8 °C)] 98.2 °F (36.8 °C)  Pulse:  [65-89] 86  Resp:  [12-22] 12  BP: (127-155)/(57-67) 127/60  Temp (24hrs), Av.9 °F (36.6 °C), Min:97.3 °F (36.3 °C), Max:98.5 °F (36.9 °C)      Intake/Outp 22.0   --   26.0   --   23.0  20.0*   CA  9.6   --   9.3   --   8.1*  7.8*     Recent Labs      01/22/19   2241  01/23/19   0051   ALT  32   --    AST   --   43*   ALB  3.6   --      No results for input(s): BNP in the last 72 hours.     Invalid input(s):

## 2019-01-27 NOTE — PLAN OF CARE
Removed restraints at 11zm. Alert and oriented x 2. Pleasantly confused and forgetful. Up in chair most of day. Educated family about elevating heels. No problems seen, but patient complained that heels were hurting.  Pre-existing right buttock wound azeem lombardo

## 2019-01-28 ENCOUNTER — NURSE ONLY (OUTPATIENT)
Dept: LAB | Age: 84
End: 2019-01-28
Attending: INTERNAL MEDICINE
Payer: MEDICARE

## 2019-01-28 ENCOUNTER — SNF ADMIT/H&P (OUTPATIENT)
Dept: INTERNAL MEDICINE CLINIC | Facility: CLINIC | Age: 84
End: 2019-01-28
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
## 2019-01-28 DIAGNOSIS — I73.9 PVD (PERIPHERAL VASCULAR DISEASE) (HCC): ICD-10-CM

## 2019-01-28 DIAGNOSIS — J44.9 CHRONIC OBSTRUCTIVE PULMONARY DISEASE, UNSPECIFIED COPD TYPE (HCC): ICD-10-CM

## 2019-01-28 DIAGNOSIS — S80.01XA CONTUSION OF RIGHT KNEE, INITIAL ENCOUNTER: ICD-10-CM

## 2019-01-28 DIAGNOSIS — I10 ESSENTIAL HYPERTENSION: ICD-10-CM

## 2019-01-28 DIAGNOSIS — I25.9 CHRONIC ISCHEMIC HEART DISEASE, UNSPECIFIED: Primary | ICD-10-CM

## 2019-01-28 DIAGNOSIS — R71.8 ELEVATED MCV: Primary | ICD-10-CM

## 2019-01-28 LAB
ALBUMIN SERPL-MCNC: 2.9 G/DL (ref 3.1–4.5)
ALBUMIN/GLOB SERPL: 0.9 {RATIO} (ref 1–2)
ALP LIVER SERPL-CCNC: 53 U/L (ref 55–142)
ALT SERPL-CCNC: 24 U/L (ref 14–54)
ANION GAP SERPL CALC-SCNC: 9 MMOL/L (ref 0–18)
AST SERPL-CCNC: 22 U/L (ref 15–41)
BASOPHILS # BLD AUTO: 0.07 X10(3) UL (ref 0–0.1)
BASOPHILS NFR BLD AUTO: 1.4 %
BILIRUB SERPL-MCNC: 0.4 MG/DL (ref 0.1–2)
BUN BLD-MCNC: 16 MG/DL (ref 8–20)
BUN/CREAT SERPL: 16.5 (ref 10–20)
CALCIUM BLD-MCNC: 9.1 MG/DL (ref 8.3–10.3)
CHLORIDE SERPL-SCNC: 110 MMOL/L (ref 101–111)
CO2 SERPL-SCNC: 23 MMOL/L (ref 22–32)
CREAT BLD-MCNC: 0.97 MG/DL (ref 0.55–1.02)
EOSINOPHIL # BLD AUTO: 0.32 X10(3) UL (ref 0–0.3)
EOSINOPHIL NFR BLD AUTO: 6.3 %
ERYTHROCYTE [DISTWIDTH] IN BLOOD BY AUTOMATED COUNT: 12.3 % (ref 11.5–16)
GLOBULIN PLAS-MCNC: 3.4 G/DL (ref 2.8–4.4)
GLUCOSE BLD-MCNC: 115 MG/DL (ref 65–99)
GLUCOSE BLD-MCNC: 85 MG/DL (ref 65–99)
GLUCOSE BLD-MCNC: 90 MG/DL (ref 65–99)
GLUCOSE BLD-MCNC: 93 MG/DL (ref 70–99)
HAV IGM SER QL: 2 MG/DL (ref 1.8–2.5)
HCT VFR BLD AUTO: 32.3 % (ref 34–50)
HGB BLD-MCNC: 11 G/DL (ref 12–16)
IMMATURE GRANULOCYTE COUNT: 0.01 X10(3) UL (ref 0–1)
IMMATURE GRANULOCYTE RATIO %: 0.2 %
LYMPHOCYTES # BLD AUTO: 1.14 X10(3) UL (ref 0.9–4)
LYMPHOCYTES NFR BLD AUTO: 22.4 %
M PROTEIN MFR SERPL ELPH: 6.3 G/DL (ref 6.4–8.2)
MCH RBC QN AUTO: 34.7 PG (ref 27–33.2)
MCHC RBC AUTO-ENTMCNC: 34.1 G/DL (ref 31–37)
MCV RBC AUTO: 101.9 FL (ref 81–100)
MONOCYTES # BLD AUTO: 0.71 X10(3) UL (ref 0.1–1)
MONOCYTES NFR BLD AUTO: 14 %
NEUTROPHIL ABS PRELIM: 2.83 X10 (3) UL (ref 1.3–6.7)
NEUTROPHILS # BLD AUTO: 2.83 X10(3) UL (ref 1.3–6.7)
NEUTROPHILS NFR BLD AUTO: 55.7 %
OSMOLALITY SERPL CALC.SUM OF ELEC: 295 MOSM/KG (ref 275–295)
PLATELET # BLD AUTO: 201 10(3)UL (ref 150–450)
POTASSIUM SERPL-SCNC: 4.3 MMOL/L (ref 3.6–5.1)
RBC # BLD AUTO: 3.17 X10(6)UL (ref 3.8–5.1)
RED CELL DISTRIBUTION WIDTH-SD: 46.3 FL (ref 35.1–46.3)
SODIUM SERPL-SCNC: 142 MMOL/L (ref 136–144)
VIT D+METAB SERPL-MCNC: 57.5 NG/ML (ref 30–100)
WBC # BLD AUTO: 5.1 X10(3) UL (ref 4–13)

## 2019-01-28 PROCEDURE — 82306 VITAMIN D 25 HYDROXY: CPT

## 2019-01-28 PROCEDURE — 80053 COMPREHEN METABOLIC PANEL: CPT

## 2019-01-28 PROCEDURE — 85025 COMPLETE CBC W/AUTO DIFF WBC: CPT

## 2019-01-28 PROCEDURE — 83735 ASSAY OF MAGNESIUM: CPT

## 2019-01-28 PROCEDURE — 99305 1ST NF CARE MODERATE MDM 35: CPT | Performed by: INTERNAL MEDICINE

## 2019-01-28 NOTE — PROGRESS NOTES
Patient was admitted to [facility].  Daughter tried calling no answers EMTs were called found her lying on the floor.   She was sent to the emergency room and initial CT scan showed a possible subarachnoid hemorrhage however repeat CT did not show any

## 2019-01-29 ENCOUNTER — TELEPHONE (OUTPATIENT)
Dept: INTERNAL MEDICINE CLINIC | Facility: CLINIC | Age: 84
End: 2019-01-29

## 2019-01-29 NOTE — PROGRESS NOTES
Received information that parameters for blood pressure was hold if systolic blood pressure less than 70 or pulse less than 60. I did not give these parameters. I did speak to the nurse.   Will hold if systolic blood pressure is less than 100 or pulse les

## 2019-01-29 NOTE — DISCHARGE SUMMARY
[facility] HOSPITALIST  DISCHARGE SUMMARY     Zakia Le Patient Status:  Inpatient    1930 MRN [id number]   [facility] 7NE-A Attending No att. providers found   Hosp Day # 4 PCP Denisse Blancas MD     Date of Admission: 2019 The patient had no indication for any intracranial intervention. Patient symptoms stabilized. However, patient became more confused in which subsequent CT scan was done and was stable.   Patient was found to have urinary tract infection which was treated CALCIUM 600+D 600-800 MG-UNIT Tabs  Generic drug:  Calcium Carb-Cholecalciferol      Take 3 tablets by mouth 3 (three) times daily. Refills:  0     Fish Oil 1200 MG Caps      Take 1,200 mg by mouth daily.    Refills:  0     Mometasone Furo-Formoterol Fu [address]44[address]  [phone]    In 2 weeks    -----------------------------------------------------------------------------------------  PATIENT DISCHARGE INSTRUCTIONS: See electronic chart    Chel Smith MD 1/28/2019    Time spent:  > 30 minutes

## 2019-01-30 ENCOUNTER — TELEPHONE (OUTPATIENT)
Dept: INTERNAL MEDICINE CLINIC | Facility: CLINIC | Age: 84
End: 2019-01-30

## 2019-01-30 NOTE — TELEPHONE ENCOUNTER
Also noted that the patient was a bit confused. When nurse removed her Depends, patient had had a BM without realizing it.

## 2019-01-31 ENCOUNTER — SNF VISIT (OUTPATIENT)
Dept: INTERNAL MEDICINE CLINIC | Facility: CLINIC | Age: 84
End: 2019-01-31

## 2019-01-31 DIAGNOSIS — H90.3 SENSORINEURAL HEARING LOSS (SNHL) OF BOTH EARS: ICD-10-CM

## 2019-01-31 DIAGNOSIS — R41.0 CONFUSION: Primary | ICD-10-CM

## 2019-01-31 DIAGNOSIS — R41.3 MEMORY DEFICIT: ICD-10-CM

## 2019-01-31 NOTE — PROGRESS NOTES
Was asked to see the patient because of confusion. Today the patient was lying in bed. She appeared to be in her normal state of mind. She did recognize. She was oriented x3. Denies any fever chills or night sweats no burning on urination.   She does h

## 2019-01-31 NOTE — PROGRESS NOTES
[facility]

## 2019-02-01 ENCOUNTER — NURSE ONLY (OUTPATIENT)
Dept: LAB | Age: 84
End: 2019-02-01
Attending: INTERNAL MEDICINE
Payer: MEDICARE

## 2019-02-01 DIAGNOSIS — E56.9 VITAMIN DISEASE: Primary | ICD-10-CM

## 2019-02-01 LAB
FOLATE SERPL-MCNC: 65.7 NG/ML (ref 5.9–?)
HAV AB SERPL IA-ACNC: 720 PG/ML (ref 193–986)

## 2019-02-01 PROCEDURE — 82746 ASSAY OF FOLIC ACID SERUM: CPT

## 2019-02-01 PROCEDURE — 82607 VITAMIN B-12: CPT

## 2019-02-02 ENCOUNTER — APPOINTMENT (OUTPATIENT)
Dept: GENERAL RADIOLOGY | Facility: HOSPITAL | Age: 84
End: 2019-02-02
Attending: STUDENT IN AN ORGANIZED HEALTH CARE EDUCATION/TRAINING PROGRAM
Payer: MEDICARE

## 2019-02-02 ENCOUNTER — HOSPITAL ENCOUNTER (EMERGENCY)
Facility: HOSPITAL | Age: 84
Discharge: HOME | End: 2019-02-03
Attending: STUDENT IN AN ORGANIZED HEALTH CARE EDUCATION/TRAINING PROGRAM
Payer: MEDICARE

## 2019-02-02 ENCOUNTER — APPOINTMENT (OUTPATIENT)
Dept: CT IMAGING | Facility: HOSPITAL | Age: 84
End: 2019-02-02
Attending: STUDENT IN AN ORGANIZED HEALTH CARE EDUCATION/TRAINING PROGRAM
Payer: MEDICARE
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
## 2019-02-02 DIAGNOSIS — N39.0 URINARY TRACT INFECTION WITHOUT HEMATURIA, SITE UNSPECIFIED: ICD-10-CM

## 2019-02-02 DIAGNOSIS — S01.81XA CHIN LACERATION, INITIAL ENCOUNTER: ICD-10-CM

## 2019-02-02 DIAGNOSIS — S01.81XA FACIAL LACERATION, INITIAL ENCOUNTER: ICD-10-CM

## 2019-02-02 DIAGNOSIS — S16.1XXA STRAIN OF NECK MUSCLE, INITIAL ENCOUNTER: ICD-10-CM

## 2019-02-02 LAB
ALBUMIN SERPL-MCNC: 3.4 G/DL (ref 3.1–4.5)
ALBUMIN/GLOB SERPL: 0.9 {RATIO} (ref 1–2)
ALP LIVER SERPL-CCNC: 72 U/L (ref 55–142)
ALT SERPL-CCNC: 22 U/L (ref 14–54)
ANION GAP SERPL CALC-SCNC: 11 MMOL/L (ref 0–18)
AST SERPL-CCNC: 23 U/L (ref 15–41)
BASOPHILS # BLD AUTO: 0.08 X10(3) UL (ref 0–0.2)
BASOPHILS NFR BLD AUTO: 0.8 %
BILIRUB SERPL-MCNC: 0.2 MG/DL (ref 0.1–2)
BUN BLD-MCNC: 27 MG/DL (ref 8–20)
BUN/CREAT SERPL: 19.9 (ref 10–20)
CALCIUM BLD-MCNC: 9.1 MG/DL (ref 8.3–10.3)
CHLORIDE SERPL-SCNC: 109 MMOL/L (ref 101–111)
CO2 SERPL-SCNC: 22 MMOL/L (ref 22–32)
CREAT BLD-MCNC: 1.36 MG/DL (ref 0.55–1.02)
DEPRECATED RDW RBC AUTO: 47 FL (ref 35.1–46.3)
EOSINOPHIL # BLD AUTO: 0.11 X10(3) UL (ref 0–0.7)
EOSINOPHIL NFR BLD AUTO: 1.1 %
ERYTHROCYTE [DISTWIDTH] IN BLOOD BY AUTOMATED COUNT: 12.2 % (ref 11–15)
GLOBULIN PLAS-MCNC: 3.7 G/DL (ref 2.8–4.4)
GLUCOSE BLD-MCNC: 124 MG/DL (ref 70–99)
HCT VFR BLD AUTO: 35.6 % (ref 35–48)
HGB BLD-MCNC: 11.9 G/DL (ref 12–16)
IMM GRANULOCYTES # BLD AUTO: 0.04 X10(3) UL (ref 0–1)
IMM GRANULOCYTES NFR BLD: 0.4 %
LYMPHOCYTES # BLD AUTO: 1 X10(3) UL (ref 1–4)
LYMPHOCYTES NFR BLD AUTO: 9.7 %
M PROTEIN MFR SERPL ELPH: 7.1 G/DL (ref 6.4–8.2)
MCH RBC QN AUTO: 35 PG (ref 26–34)
MCHC RBC AUTO-ENTMCNC: 33.4 G/DL (ref 31–37)
MCV RBC AUTO: 104.7 FL (ref 80–100)
MONOCYTES # BLD AUTO: 1.02 X10(3) UL (ref 0.1–1)
MONOCYTES NFR BLD AUTO: 9.9 %
NEUTROPHILS # BLD AUTO: 8.05 X10 (3) UL (ref 1.5–7.7)
NEUTROPHILS # BLD AUTO: 8.05 X10(3) UL (ref 1.5–7.7)
NEUTROPHILS NFR BLD AUTO: 78.1 %
OSMOLALITY SERPL CALC.SUM OF ELEC: 301 MOSM/KG (ref 275–295)
PLATELET # BLD AUTO: 269 10(3)UL (ref 150–450)
POTASSIUM SERPL-SCNC: 4.2 MMOL/L (ref 3.6–5.1)
RBC # BLD AUTO: 3.4 X10(6)UL (ref 3.8–5.3)
SODIUM SERPL-SCNC: 142 MMOL/L (ref 136–144)
TROPONIN I SERPL-MCNC: <0.046 NG/ML (ref ?–0.05)
WBC # BLD AUTO: 10.3 X10(3) UL (ref 4–11)

## 2019-02-02 PROCEDURE — 71045 X-RAY EXAM CHEST 1 VIEW: CPT | Performed by: STUDENT IN AN ORGANIZED HEALTH CARE EDUCATION/TRAINING PROGRAM

## 2019-02-02 PROCEDURE — 70450 CT HEAD/BRAIN W/O DYE: CPT | Performed by: STUDENT IN AN ORGANIZED HEALTH CARE EDUCATION/TRAINING PROGRAM

## 2019-02-02 PROCEDURE — 93005 ELECTROCARDIOGRAM TRACING: CPT

## 2019-02-02 PROCEDURE — 84484 ASSAY OF TROPONIN QUANT: CPT | Performed by: STUDENT IN AN ORGANIZED HEALTH CARE EDUCATION/TRAINING PROGRAM

## 2019-02-02 PROCEDURE — 72125 CT NECK SPINE W/O DYE: CPT | Performed by: STUDENT IN AN ORGANIZED HEALTH CARE EDUCATION/TRAINING PROGRAM

## 2019-02-02 PROCEDURE — 76377 3D RENDER W/INTRP POSTPROCES: CPT

## 2019-02-02 PROCEDURE — 70486 CT MAXILLOFACIAL W/O DYE: CPT | Performed by: STUDENT IN AN ORGANIZED HEALTH CARE EDUCATION/TRAINING PROGRAM

## 2019-02-02 PROCEDURE — 93010 ELECTROCARDIOGRAM REPORT: CPT

## 2019-02-02 PROCEDURE — 80053 COMPREHEN METABOLIC PANEL: CPT | Performed by: STUDENT IN AN ORGANIZED HEALTH CARE EDUCATION/TRAINING PROGRAM

## 2019-02-02 PROCEDURE — 12014 RPR F/E/E/N/L/M 5.1-7.5 CM: CPT

## 2019-02-02 PROCEDURE — 99285 EMERGENCY DEPT VISIT HI MDM: CPT

## 2019-02-02 PROCEDURE — 96374 THER/PROPH/DIAG INJ IV PUSH: CPT

## 2019-02-02 PROCEDURE — 85025 COMPLETE CBC W/AUTO DIFF WBC: CPT | Performed by: STUDENT IN AN ORGANIZED HEALTH CARE EDUCATION/TRAINING PROGRAM

## 2019-02-02 PROCEDURE — 96375 TX/PRO/DX INJ NEW DRUG ADDON: CPT

## 2019-02-02 RX ORDER — ONDANSETRON 2 MG/ML
4 INJECTION INTRAMUSCULAR; INTRAVENOUS ONCE
Status: COMPLETED | OUTPATIENT
Start: 2019-02-02 | End: 2019-02-02

## 2019-02-03 VITALS
WEIGHT: 97 LBS | RESPIRATION RATE: 16 BRPM | DIASTOLIC BLOOD PRESSURE: 87 MMHG | TEMPERATURE: 98 F | OXYGEN SATURATION: 97 % | SYSTOLIC BLOOD PRESSURE: 120 MMHG | HEART RATE: 88 BPM | BODY MASS INDEX: 16.56 KG/M2 | HEIGHT: 64 IN

## 2019-02-03 LAB
ATRIAL RATE: 78 BPM
BILIRUB UR QL STRIP.AUTO: NEGATIVE
CLARITY UR REFRACT.AUTO: CLEAR
COLOR UR AUTO: YELLOW
GLUCOSE UR STRIP.AUTO-MCNC: NEGATIVE MG/DL
NITRITE UR QL STRIP.AUTO: NEGATIVE
P AXIS: 80 DEGREES
P-R INTERVAL: 148 MS
PH UR STRIP.AUTO: 7 [PH] (ref 4.5–8)
Q-T INTERVAL: 394 MS
QRS DURATION: 78 MS
QTC CALCULATION (BEZET): 449 MS
R AXIS: 7 DEGREES
RBC UR QL AUTO: NEGATIVE
SP GR UR STRIP.AUTO: 1.02 (ref 1–1.03)
T AXIS: 39 DEGREES
UROBILINOGEN UR STRIP.AUTO-MCNC: 0.2 MG/DL
VENTRICULAR RATE: 78 BPM

## 2019-02-03 PROCEDURE — 87086 URINE CULTURE/COLONY COUNT: CPT | Performed by: STUDENT IN AN ORGANIZED HEALTH CARE EDUCATION/TRAINING PROGRAM

## 2019-02-03 PROCEDURE — 81001 URINALYSIS AUTO W/SCOPE: CPT | Performed by: STUDENT IN AN ORGANIZED HEALTH CARE EDUCATION/TRAINING PROGRAM

## 2019-02-03 RX ORDER — NITROFURANTOIN 25; 75 MG/1; MG/1
100 CAPSULE ORAL 2 TIMES DAILY
Qty: 14 CAPSULE | Refills: 0 | Status: SHIPPED | OUTPATIENT
Start: 2019-02-03 | End: 2019-02-10

## 2019-02-03 NOTE — ED NOTES
Received pt from [facility]. Pt remains in bed on continuous cardiac monitor and pulse oximetry. Patient requesting to have C collar removed. Pt aware she is to keep collar in place until xray and CT reports are read and reported. Pt agitated. MD aware.  Compl

## 2019-02-03 NOTE — ED INITIAL ASSESSMENT (HPI)
Per EMS, patient experienced multiple unwitnessed falls in her room at the nursing facility. Patient normally alert 2 of 4 per staff at nursing home.  Patient has lacerations to her chin, but she has no complaints of pain otherwise,

## 2019-02-03 NOTE — ED PROVIDER NOTES
Patient Seen in: [facility] Emergency Department    History   Patient presents with:  Fall (musculoskeletal, neurologic)    Stated Complaint:     HPI    Patient is an 78-year-old female who presents the emergency department from nursing home with mu 1995        Years since quittin.4      Smokeless tobacco: Never Used    Alcohol use:  Yes      Alcohol/week: 4.2 oz      Types: 7 Shots of liquor per week      Comment: 1 a day    Drug use: No      Review of Systems    Positive for stated complain American 35 (*)     GFR, -American 40 (*)     A/G Ratio 0.9 (*)     All other components within normal limits   URINALYSIS WITH CULTURE REFLEX - Abnormal; Notable for the following components:    Ketones Urine Trace (*)     Protein Urine 30 mg/dL Triston Ignacio fractures or dislocations. Wound was irrigated extensively and was repaired. Patients tetanus is up to date. UA identified a urinary tract infection for which the patient will be started on Macrobid.   Remainder of the emergency department workup was Refills: 0

## 2019-02-04 ENCOUNTER — SNF VISIT (OUTPATIENT)
Dept: INTERNAL MEDICINE CLINIC | Facility: CLINIC | Age: 84
End: 2019-02-04

## 2019-02-04 DIAGNOSIS — S16.1XXA STRAIN OF NECK MUSCLE, INITIAL ENCOUNTER: ICD-10-CM

## 2019-02-04 DIAGNOSIS — S01.81XA FACIAL LACERATION, INITIAL ENCOUNTER: ICD-10-CM

## 2019-02-04 DIAGNOSIS — N30.00 ACUTE CYSTITIS WITHOUT HEMATURIA: ICD-10-CM
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
## 2019-02-04 PROCEDURE — 99309 SBSQ NF CARE MODERATE MDM 30: CPT | Performed by: INTERNAL MEDICINE

## 2019-02-04 NOTE — PROGRESS NOTES
Patient fell over the weekend and sustained a laceration underneath the lip and chin. Was sent to the emergency room. She was found to have a 2.5 cm underneath the lower lip and a 3.5 cm and.  2 sutures were placed.   Did have a CT scan which showed no ac

## 2019-02-11 ENCOUNTER — SNF VISIT (OUTPATIENT)
Dept: INTERNAL MEDICINE CLINIC | Facility: CLINIC | Age: 84
End: 2019-02-11
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
## 2019-02-11 DIAGNOSIS — N30.00 ACUTE CYSTITIS WITHOUT HEMATURIA: ICD-10-CM

## 2019-02-11 DIAGNOSIS — S01.81XA FACIAL LACERATION, INITIAL ENCOUNTER: ICD-10-CM

## 2019-02-11 PROCEDURE — 99307 SBSQ NF CARE SF MDM 10: CPT | Performed by: INTERNAL MEDICINE

## 2019-02-11 NOTE — PROGRESS NOTES
Jean Carlos Navarrete offers no complaints. Does have a laceration underneath the lower lip and chin. Sutures to be removed today. She is also being treated for urinary tract infection but she denies any dysuria hematuria. Is alert in no acute distress.     Physical e

## 2019-02-13 NOTE — PROGRESS NOTES
I met with daughter in the [place]. She informed me that Dr. Ana Del Cid assessment indicated that she should not consume any alcohol at all. She states that I told the patient in the presence that she may have 1 alcoholic beverage a day.   Frankly I do not rem

## 2019-02-19 ENCOUNTER — SNF VISIT (OUTPATIENT)
Dept: INTERNAL MEDICINE CLINIC | Facility: CLINIC | Age: 84
End: 2019-02-19

## 2019-02-19 DIAGNOSIS — N18.30 CKD (CHRONIC KIDNEY DISEASE) STAGE 3, GFR 30-59 ML/MIN (HCC): ICD-10-CM

## 2019-02-19 DIAGNOSIS — I34.0 NON-RHEUMATIC MITRAL REGURGITATION: Primary | ICD-10-CM

## 2019-02-19 DIAGNOSIS — J44.9 CHRONIC OBSTRUCTIVE PULMONARY DISEASE, UNSPECIFIED COPD TYPE (HCC): ICD-10-CM

## 2019-02-19 DIAGNOSIS — I10 ESSENTIAL HYPERTENSION: ICD-10-CM

## 2019-02-19 DIAGNOSIS — I25.10 CORONARY ARTERY DISEASE INVOLVING NATIVE HEART WITHOUT ANGINA PECTORIS, UNSPECIFIED VESSEL OR LESION TYPE: ICD-10-CM

## 2019-02-19 DIAGNOSIS — R62.7 FAILURE TO THRIVE IN ADULT: ICD-10-CM

## 2019-02-19 PROCEDURE — 99307 SBSQ NF CARE SF MDM 10: CPT | Performed by: INTERNAL MEDICINE

## 2019-02-19 NOTE — PROGRESS NOTES
Patient had a recent fall sustaining lacerations lower lip and chin. These were sutured and sutures have been removed with excellent healing.   She does have history of CAD she is asymptomatic as he is asymptomatic for her chronic obstructive pulmonary dis

## 2019-02-20 ENCOUNTER — TELEPHONE (OUTPATIENT)
Dept: INTERNAL MEDICINE CLINIC | Facility: CLINIC | Age: 84
End: 2019-02-20

## 2019-02-21 ENCOUNTER — TELEPHONE (OUTPATIENT)
Dept: INTERNAL MEDICINE CLINIC | Facility: CLINIC | Age: 84
End: 2019-02-21

## 2019-02-21 NOTE — TELEPHONE ENCOUNTER
Please inform the [facility] that the patient is to be discharged to assisted living.   She is in the [address] [place] [address]

## 2019-03-28 ENCOUNTER — TELEPHONE (OUTPATIENT)
Dept: INTERNAL MEDICINE CLINIC | Facility: CLINIC | Age: 84
End: 2019-03-28

## 2019-03-28 ENCOUNTER — EXTERNAL FACILITY (OUTPATIENT)
Dept: INTERNAL MEDICINE CLINIC | Facility: CLINIC | Age: 84
End: 2019-03-28

## 2019-03-28 DIAGNOSIS — R10.9 ABDOMINAL PAIN, UNSPECIFIED ABDOMINAL LOCATION: Primary | ICD-10-CM

## 2019-03-28 DIAGNOSIS — R41.3 MEMORY DEFICIT: ICD-10-CM

## 2019-03-28 DIAGNOSIS — R07.81 PLEURITIC CHEST PAIN: Primary | ICD-10-CM

## 2019-03-28 DIAGNOSIS — M54.16 LUMBAR RADICULAR PAIN: ICD-10-CM

## 2019-03-28 PROBLEM — S00.83XA CONTUSION OF FACE, INITIAL ENCOUNTER: Status: RESOLVED | Noted: 2019-01-23 | Resolved: 2019-03-28

## 2019-03-28 RX ORDER — FAMOTIDINE 20 MG
1 TABLET ORAL 2 TIMES DAILY
Qty: 90 TABLET | Refills: 3 | Status: SHIPPED | OUTPATIENT
Start: 2019-03-28 | End: 2019-11-15

## 2019-03-28 RX ORDER — AMOXICILLIN 500 MG
1200 CAPSULE ORAL DAILY
Qty: 90 CAPSULE | Refills: 3 | Status: SHIPPED | OUTPATIENT
Start: 2019-03-28 | End: 2020-04-06

## 2019-03-28 RX ORDER — ALBUTEROL SULFATE 90 UG/1
2 AEROSOL, METERED RESPIRATORY (INHALATION) EVERY 6 HOURS PRN
Qty: 1 INHALER | Refills: 3 | Status: SHIPPED | OUTPATIENT
Start: 2019-03-28 | End: 2019-04-30

## 2019-03-28 RX ORDER — OLMESARTAN MEDOXOMIL 20 MG/1
20 TABLET ORAL DAILY
Qty: 90 TABLET | Refills: 3 | Status: SHIPPED | OUTPATIENT
Start: 2019-03-28 | End: 2020-02-05

## 2019-03-28 RX ORDER — CELECOXIB 200 MG/1
200 CAPSULE ORAL DAILY
COMMUNITY
End: 2019-03-28

## 2019-03-28 RX ORDER — FEXOFENADINE HCL 180 MG/1
180 TABLET ORAL DAILY
COMMUNITY
End: 2019-03-28

## 2019-03-28 RX ORDER — CELECOXIB 200 MG/1
200 CAPSULE ORAL DAILY
Qty: 90 CAPSULE | Refills: 3 | Status: SHIPPED | OUTPATIENT
Start: 2019-03-28 | End: 2020-04-06

## 2019-03-28 RX ORDER — FEXOFENADINE HCL 180 MG/1
180 TABLET ORAL DAILY
Qty: 90 TABLET | Refills: 3 | Status: SHIPPED | OUTPATIENT
Start: 2019-03-28 | End: 2020-04-06

## 2019-03-28 RX ORDER — UBIDECARENONE 75 MG
100 CAPSULE ORAL DAILY
Qty: 90 TABLET | Refills: 3 | Status: SHIPPED | OUTPATIENT
Start: 2019-03-28 | End: 2020-04-06

## 2019-03-28 NOTE — TELEPHONE ENCOUNTER
Called and spoke with Kristen. Let her know that a chest xray and CBC was ordered and the ultrasound of abdomin and appendix was canceled.  Daughter was called also

## 2019-03-28 NOTE — TELEPHONE ENCOUNTER
Patients daughter called and stated that her mother was seen today for abdominal pain but patient is not having abdominal pain she is having pain under her right breast and left side of back below the scapula.  The pain increases when she takes a deep breat

## 2019-03-28 NOTE — PROGRESS NOTES
The patient assisted living with memory deficit. Complaining of abdominal pain. Patient resting comfortably in bed. Denies any pain. States appetite is good moving her bowels.     Physical examination pleasant individual appears in no acute distress afe

## 2019-03-29 ENCOUNTER — NURSE ONLY (OUTPATIENT)
Dept: LAB | Age: 84
End: 2019-03-29
Attending: INTERNAL MEDICINE
Payer: MEDICARE

## 2019-03-29 DIAGNOSIS — R10.9 ABDOMINAL PAIN: ICD-10-CM

## 2019-03-29 DIAGNOSIS — R41.3 MEMORY DEFICIT: Primary | ICD-10-CM

## 2019-03-29 LAB
BASOPHILS # BLD AUTO: 0.05 X10(3) UL (ref 0–0.2)
BASOPHILS NFR BLD AUTO: 0.7 %
DEPRECATED RDW RBC AUTO: 44.4 FL (ref 35.1–46.3)
EOSINOPHIL # BLD AUTO: 0.18 X10(3) UL (ref 0–0.7)
EOSINOPHIL NFR BLD AUTO: 2.5 %
ERYTHROCYTE [DISTWIDTH] IN BLOOD BY AUTOMATED COUNT: 12.1 % (ref 11–15)
HCT VFR BLD AUTO: 29.6 % (ref 35–48)
HGB BLD-MCNC: 10 G/DL (ref 12–16)
IMM GRANULOCYTES # BLD AUTO: 0.02 X10(3) UL (ref 0–1)
IMM GRANULOCYTES NFR BLD: 0.3 %
LYMPHOCYTES # BLD AUTO: 1.19 X10(3) UL (ref 1–4)
LYMPHOCYTES NFR BLD AUTO: 16.7 %
MCH RBC QN AUTO: 33.6 PG (ref 26–34)
MCHC RBC AUTO-ENTMCNC: 33.8 G/DL (ref 31–37)
MCV RBC AUTO: 99.3 FL (ref 80–100)
MONOCYTES # BLD AUTO: 0.81 X10(3) UL (ref 0.1–1)
MONOCYTES NFR BLD AUTO: 11.4 %
NEUTROPHILS # BLD AUTO: 4.87 X10 (3) UL (ref 1.5–7.7)
NEUTROPHILS # BLD AUTO: 4.87 X10(3) UL (ref 1.5–7.7)
NEUTROPHILS NFR BLD AUTO: 68.4 %
PLATELET # BLD AUTO: 242 10(3)UL (ref 150–450)
RBC # BLD AUTO: 2.98 X10(6)UL (ref 3.8–5.3)
WBC # BLD AUTO: 7.1 X10(3) UL (ref 4–11)

## 2019-03-29 PROCEDURE — 85025 COMPLETE CBC W/AUTO DIFF WBC: CPT

## 2019-04-01 ENCOUNTER — EXTERNAL FACILITY (OUTPATIENT)
Dept: INTERNAL MEDICINE CLINIC | Facility: CLINIC | Age: 84
End: 2019-04-01

## 2019-04-01 DIAGNOSIS — R41.3 MEMORY DEFICIT: ICD-10-CM

## 2019-04-01 DIAGNOSIS — I10 ESSENTIAL HYPERTENSION: ICD-10-CM

## 2019-04-01 DIAGNOSIS — J44.9 CHRONIC OBSTRUCTIVE PULMONARY DISEASE, UNSPECIFIED COPD TYPE (HCC): Primary | ICD-10-CM

## 2019-04-01 NOTE — PROGRESS NOTES
Received a message that patient was complaining of bringing up mucus and chest pain. .  Patient today denies any chest pain. Still has a cough. Chest x-ray showed no signs of pulmonary congestion.   Vital signs are stable including a blood pressure 122/78

## 2019-04-05 ENCOUNTER — TELEPHONE (OUTPATIENT)
Dept: INTERNAL MEDICINE CLINIC | Facility: CLINIC | Age: 84
End: 2019-04-05

## 2019-04-05 RX ORDER — ACETAMINOPHEN 500 MG
500 TABLET ORAL EVERY 6 HOURS PRN
Qty: 60 TABLET | Refills: 0 | Status: SHIPPED | OUTPATIENT
Start: 2019-04-05 | End: 2020-06-05

## 2019-04-05 NOTE — TELEPHONE ENCOUNTER
Kristen from [facility] at the [facility] states that the patient has cyst that is 0.4 cm by 0.2 cm on her right great toe. The area is red and tender to the touch. Saundra Koo states that the patient bumps her toe on the bed rail while in bed.  Kristen would like to k

## 2019-04-29 ENCOUNTER — TELEPHONE (OUTPATIENT)
Dept: INTERNAL MEDICINE CLINIC | Facility: CLINIC | Age: 84
End: 2019-04-29

## 2019-04-29 NOTE — TELEPHONE ENCOUNTER
VOICEMAIL 1:49pm     Would like to talk with an RN on a medication error over at the [facility]

## 2019-04-29 NOTE — TELEPHONE ENCOUNTER
Ayesha Elizondo states that her Mom is being given too much Calcium +D. The order reads 3 tablets twice a day. She used to be on 1 tablet three times a day. Please change order back    Requests a Vitamin D Level and Calcium Level to check this.     Requests CBC -

## 2019-04-30 ENCOUNTER — TELEPHONE (OUTPATIENT)
Dept: INTERNAL MEDICINE CLINIC | Facility: CLINIC | Age: 84
End: 2019-04-30

## 2019-04-30 ENCOUNTER — EXTERNAL FACILITY (OUTPATIENT)
Dept: INTERNAL MEDICINE CLINIC | Facility: CLINIC | Age: 84
End: 2019-04-30

## 2019-04-30 DIAGNOSIS — F32.A DEPRESSION, UNSPECIFIED DEPRESSION TYPE: Primary | ICD-10-CM

## 2019-04-30 DIAGNOSIS — R53.83 OTHER FATIGUE: ICD-10-CM

## 2019-04-30 DIAGNOSIS — N18.30 CKD (CHRONIC KIDNEY DISEASE) STAGE 3, GFR 30-59 ML/MIN (HCC): ICD-10-CM

## 2019-04-30 DIAGNOSIS — I10 ESSENTIAL HYPERTENSION: ICD-10-CM

## 2019-04-30 DIAGNOSIS — R53.83 FATIGUE, UNSPECIFIED TYPE: ICD-10-CM

## 2019-04-30 DIAGNOSIS — E83.51 HYPOCALCEMIA: ICD-10-CM

## 2019-04-30 DIAGNOSIS — J44.9 CHRONIC OBSTRUCTIVE PULMONARY DISEASE, UNSPECIFIED COPD TYPE (HCC): ICD-10-CM

## 2019-04-30 DIAGNOSIS — E83.51 HYPOCALCEMIA: Primary | ICD-10-CM

## 2019-04-30 NOTE — PROGRESS NOTES
I was asked to see Gelacio Dela Cruz. Daughter had some concerns. First concerns was increased level vitamin D and calcium. I agree with her concern. We did decrease the calcium and vitamin D to just 1 tablet twice daily.   Daughter was also concerned about the fa

## 2019-04-30 NOTE — TELEPHONE ENCOUNTER
Gave daughter update. Apparently she did not come in to see mom. Her brother that she is living in [facility] and she agrees with the changes that we have made.

## 2019-05-01 ENCOUNTER — NURSE ONLY (OUTPATIENT)
Dept: LAB | Age: 84
End: 2019-05-01
Attending: INTERNAL MEDICINE
Payer: MEDICARE

## 2019-05-01 DIAGNOSIS — E83.51 HYPOCALCEMIA: ICD-10-CM

## 2019-05-01 DIAGNOSIS — R53.83 FATIGUE, UNSPECIFIED TYPE: ICD-10-CM

## 2019-05-01 PROCEDURE — 80053 COMPREHEN METABOLIC PANEL: CPT

## 2019-05-01 PROCEDURE — 85025 COMPLETE CBC W/AUTO DIFF WBC: CPT

## 2019-05-01 PROCEDURE — 82306 VITAMIN D 25 HYDROXY: CPT

## 2019-05-02 NOTE — PROGRESS NOTES
We did ask psychiatrist Dr. Ilia Mccoy see the patient. She started her on Zoloft however patient refuses to take it.

## 2019-05-06 ENCOUNTER — TELEPHONE (OUTPATIENT)
Dept: INTERNAL MEDICINE CLINIC | Facility: CLINIC | Age: 84
End: 2019-05-06

## 2019-05-06 NOTE — TELEPHONE ENCOUNTER
Did you want Murial to follow up with Dr Lynsey Gomez or Psychiatrist Dr Myla Trotter. Dr Nuha Torres office got a call from the daughter Sonya Cervantes stating you recommended follow ups.     Need clarification

## 2019-05-06 NOTE — TELEPHONE ENCOUNTER
Phone call from Dr Angely Montana - Patient's daughter had called her requesting that she see her Mom for counseling only, to help adjust to living in assisted living since they have had a relationship in the past.  States she does not need an order or referral f

## 2019-05-06 NOTE — TELEPHONE ENCOUNTER
I spoke with Anay at [place] [address] who states Dr Kirstin Thomas has recently seen her.   I left a message at Dr Nori Hamm office stating the referral was for Dr Kirstin Thomas - not Dr Stout Certain

## 2019-05-08 ENCOUNTER — TELEPHONE (OUTPATIENT)
Dept: INTERNAL MEDICINE CLINIC | Facility: CLINIC | Age: 84
End: 2019-05-08

## 2019-05-08 NOTE — TELEPHONE ENCOUNTER
Wanted to know which eye or if it was both eyes that were to receive the medications. Called [facility] and spoke to Jonnathan Monique. She asked patient and the patient stated that the ophthalmologist suggested that she put the eye drops in both eyes 2x daily.  Once in

## 2019-06-06 RX ORDER — FLUTICASONE FUROATE, UMECLIDINIUM BROMIDE AND VILANTEROL TRIFENATATE 100; 62.5; 25 UG/1; UG/1; UG/1
POWDER RESPIRATORY (INHALATION)
Qty: 1 EACH | Refills: 11 | Status: SHIPPED | OUTPATIENT
Start: 2019-06-06 | End: 2020-05-14

## 2019-08-06 ENCOUNTER — EXTERNAL FACILITY (OUTPATIENT)
Dept: INTERNAL MEDICINE CLINIC | Facility: CLINIC | Age: 84
End: 2019-08-06

## 2019-08-06 DIAGNOSIS — I73.9 PVD (PERIPHERAL VASCULAR DISEASE) (HCC): Primary | ICD-10-CM

## 2019-08-06 DIAGNOSIS — I10 ESSENTIAL HYPERTENSION: ICD-10-CM

## 2019-08-06 DIAGNOSIS — J44.9 CHRONIC OBSTRUCTIVE PULMONARY DISEASE, UNSPECIFIED COPD TYPE (HCC): ICD-10-CM

## 2019-08-06 NOTE — PROGRESS NOTES
This an 42-year-old female without any complaints. He does have history of hypertension but she denies headaches dizziness chest pain shortness of breath.   History of peripheral vascular disease but patient has been asymptomatic most likely because she do

## 2019-10-02 ENCOUNTER — TELEPHONE (OUTPATIENT)
Dept: INTERNAL MEDICINE CLINIC | Facility: CLINIC | Age: 84
End: 2019-10-02

## 2019-11-17 RX ORDER — I-VITE, TAB 1000-60-2MG (60/BT) 300MCG-200
TAB ORAL
Qty: 90 TABLET | Refills: 3 | Status: SHIPPED | OUTPATIENT
Start: 2019-11-17 | End: 2020-05-07

## 2019-12-18 RX ORDER — DIPHENHYDRAMINE HCL 25 MG
TABLET,DISINTEGRATING ORAL
Qty: 60 TABLET | Refills: 11 | Status: SHIPPED | OUTPATIENT
Start: 2019-12-18 | End: 2020-11-30

## 2019-12-27 ENCOUNTER — NURSE ONLY (OUTPATIENT)
Dept: LAB | Age: 84
End: 2019-12-27
Attending: INTERNAL MEDICINE
Payer: MEDICARE

## 2019-12-27 DIAGNOSIS — Z00.00 ROUTINE CHECK-UP: ICD-10-CM

## 2019-12-27 DIAGNOSIS — E56.9 VITAMIN DISEASE: Primary | ICD-10-CM

## 2019-12-27 PROCEDURE — 84443 ASSAY THYROID STIM HORMONE: CPT

## 2019-12-27 PROCEDURE — 80061 LIPID PANEL: CPT

## 2019-12-27 PROCEDURE — 85025 COMPLETE CBC W/AUTO DIFF WBC: CPT

## 2019-12-27 PROCEDURE — 82306 VITAMIN D 25 HYDROXY: CPT

## 2019-12-27 PROCEDURE — 80053 COMPREHEN METABOLIC PANEL: CPT

## 2019-12-27 PROCEDURE — 82746 ASSAY OF FOLIC ACID SERUM: CPT

## 2019-12-27 PROCEDURE — 82607 VITAMIN B-12: CPT

## 2019-12-27 PROCEDURE — 83036 HEMOGLOBIN GLYCOSYLATED A1C: CPT

## 2020-01-01 NOTE — PATIENT INSTRUCTIONS
Rebecca Le's SCREENING SCHEDULE   Tests on this list are recommended by your physician but may not be covered, or covered at this frequency, by your insurer. Please check with your insurance carrier before scheduling to verify coverage.    PREVENTATIVE family history    Colorectal Cancer Screening  Covered up to Age 76     Colonoscopy Screen   Covered every 10 years- more often if abnormal There are no preventive care reminders to display for this patient.  Update [place] if applicable    Flex [address] ANTIG   Orders placed or performed in visit on 10/13/15  -FLU VACC PRSV FREE INC ANTIG   Orders placed or performed in visit on 10/22/14  -FLU VACC [address] ANTIG   Orders placed or performed in visit on 10/16/13  -[facility] information including definitions of the different types of Advance Directives. It also has the State forms available on it's website for anyone to review and print using their home computer and printer. (the forms are also available in [address])  www. [email or web address] admit to NBN for routine care  monitor vitals per unit protocol   encourage breastfeeding  daily weight, monitor for % loss  monitor bilirubin per unit protocol   Hep B vaccine recommended   CCHD and hearing prior to discharge

## 2020-03-24 ENCOUNTER — NURSE ONLY (OUTPATIENT)
Dept: LAB | Age: 85
End: 2020-03-24
Attending: INTERNAL MEDICINE
Payer: MEDICARE

## 2020-03-24 DIAGNOSIS — R69 DIAGNOSIS UNKNOWN: ICD-10-CM

## 2020-03-24 DIAGNOSIS — R69 DIAGNOSIS DEFERRED: Primary | ICD-10-CM

## 2020-03-24 LAB
ALBUMIN SERPL-MCNC: 3.2 G/DL (ref 3.4–5)
ALBUMIN/GLOB SERPL: 0.8 {RATIO} (ref 1–2)
ALP LIVER SERPL-CCNC: 69 U/L (ref 55–142)
ALT SERPL-CCNC: 16 U/L (ref 13–56)
ANION GAP SERPL CALC-SCNC: 5 MMOL/L (ref 0–18)
AST SERPL-CCNC: 14 U/L (ref 15–37)
BASOPHILS # BLD AUTO: 0.07 X10(3) UL (ref 0–0.2)
BASOPHILS NFR BLD AUTO: 1.4 %
BILIRUB SERPL-MCNC: 0.4 MG/DL (ref 0.1–2)
BUN BLD-MCNC: 35 MG/DL (ref 7–18)
BUN/CREAT SERPL: 20.7 (ref 10–20)
CALCIUM BLD-MCNC: 9.4 MG/DL (ref 8.5–10.1)
CHLORIDE SERPL-SCNC: 102 MMOL/L (ref 98–112)
CO2 SERPL-SCNC: 28 MMOL/L (ref 21–32)
CREAT BLD-MCNC: 1.69 MG/DL (ref 0.55–1.02)
DEPRECATED RDW RBC AUTO: 43.3 FL (ref 35.1–46.3)
EOSINOPHIL # BLD AUTO: 0.28 X10(3) UL (ref 0–0.7)
EOSINOPHIL NFR BLD AUTO: 5.6 %
ERYTHROCYTE [DISTWIDTH] IN BLOOD BY AUTOMATED COUNT: 11.8 % (ref 11–15)
GLOBULIN PLAS-MCNC: 3.8 G/DL (ref 2.8–4.4)
GLUCOSE BLD-MCNC: 86 MG/DL (ref 70–99)
HCT VFR BLD AUTO: 28.9 % (ref 35–48)
HGB BLD-MCNC: 9.3 G/DL (ref 12–16)
IMM GRANULOCYTES # BLD AUTO: 0.01 X10(3) UL (ref 0–1)
IMM GRANULOCYTES NFR BLD: 0.2 %
LYMPHOCYTES # BLD AUTO: 1.26 X10(3) UL (ref 1–4)
LYMPHOCYTES NFR BLD AUTO: 25.3 %
M PROTEIN MFR SERPL ELPH: 7 G/DL (ref 6.4–8.2)
MCH RBC QN AUTO: 32.4 PG (ref 26–34)
MCHC RBC AUTO-ENTMCNC: 32.2 G/DL (ref 31–37)
MCV RBC AUTO: 100.7 FL (ref 80–100)
MONOCYTES # BLD AUTO: 0.76 X10(3) UL (ref 0.1–1)
MONOCYTES NFR BLD AUTO: 15.3 %
NEUTROPHILS # BLD AUTO: 2.6 X10 (3) UL (ref 1.5–7.7)
NEUTROPHILS # BLD AUTO: 2.6 X10(3) UL (ref 1.5–7.7)
NEUTROPHILS NFR BLD AUTO: 52.2 %
OSMOLALITY SERPL CALC.SUM OF ELEC: 287 MOSM/KG (ref 275–295)
PATIENT FASTING Y/N/NP: YES
PLATELET # BLD AUTO: 226 10(3)UL (ref 150–450)
POTASSIUM SERPL-SCNC: 4.7 MMOL/L (ref 3.5–5.1)
RBC # BLD AUTO: 2.87 X10(6)UL (ref 3.8–5.3)
SODIUM SERPL-SCNC: 135 MMOL/L (ref 136–145)
WBC # BLD AUTO: 5 X10(3) UL (ref 4–11)

## 2020-03-24 PROCEDURE — 85025 COMPLETE CBC W/AUTO DIFF WBC: CPT

## 2020-03-24 PROCEDURE — 80053 COMPREHEN METABOLIC PANEL: CPT

## 2020-03-24 PROCEDURE — 36415 COLL VENOUS BLD VENIPUNCTURE: CPT

## 2020-04-03 DIAGNOSIS — M54.16 LUMBAR RADICULAR PAIN: ICD-10-CM

## 2020-04-06 RX ORDER — FEXOFENADINE HYDROCHLORIDE 180 MG/1
TABLET, FILM COATED ORAL
Qty: 90 TABLET | Refills: 3 | Status: ON HOLD | OUTPATIENT
Start: 2020-04-06 | End: 2020-10-02

## 2020-04-06 RX ORDER — AMOXICILLIN 500 MG
CAPSULE ORAL
Qty: 90 CAPSULE | Refills: 3 | Status: SHIPPED | OUTPATIENT
Start: 2020-04-06 | End: 2021-03-08

## 2020-04-06 RX ORDER — CELECOXIB 200 MG/1
CAPSULE ORAL
Qty: 90 CAPSULE | Refills: 3 | Status: SHIPPED | OUTPATIENT
Start: 2020-04-06 | End: 2020-07-20 | Stop reason: ALTCHOICE

## 2020-04-06 RX ORDER — UBIDECARENONE 75 MG
CAPSULE ORAL
Qty: 90 TABLET | Refills: 3 | Status: SHIPPED | OUTPATIENT
Start: 2020-04-06 | End: 2021-03-08

## 2020-04-06 RX ORDER — MULTIVIT-MIN/IRON FUM/FOLIC AC 7.5 MG-4
TABLET ORAL
Qty: 90 TABLET | Refills: 3 | Status: SHIPPED | OUTPATIENT
Start: 2020-04-06 | End: 2021-03-23

## 2020-04-06 NOTE — TELEPHONE ENCOUNTER
Antonio Hanks 100-62.5-25 MCG/INH Inhalation Aerosol Powder    Last refilled: 6/6/19 - 1 each, 11 refills

## 2020-05-07 RX ORDER — I-VITE, TAB 1000-60-2MG (60/BT) 300MCG-200
TAB ORAL
Qty: 90 TABLET | Refills: 3 | Status: SHIPPED | OUTPATIENT
Start: 2020-05-07 | End: 2020-11-04

## 2020-05-14 RX ORDER — FLUTICASONE FUROATE, UMECLIDINIUM BROMIDE AND VILANTEROL TRIFENATATE 100; 62.5; 25 UG/1; UG/1; UG/1
POWDER RESPIRATORY (INHALATION)
Qty: 60 EACH | Refills: 11 | Status: SHIPPED | OUTPATIENT
Start: 2020-05-14 | End: 2021-04-25

## 2020-06-05 RX ORDER — PSEUDOEPHED/ACETAMINOPH/DIPHEN 30MG-500MG
TABLET ORAL
Qty: 60 TABLET | Refills: 0 | Status: SHIPPED | OUTPATIENT
Start: 2020-06-05 | End: 2021-07-23

## 2020-06-16 ENCOUNTER — LAB REQUISITION (OUTPATIENT)
Dept: LAB | Facility: HOSPITAL | Age: 85
End: 2020-06-16
Payer: MEDICARE

## 2020-06-16 DIAGNOSIS — Z11.59 ENCOUNTER FOR SCREENING FOR OTHER VIRAL DISEASES: ICD-10-CM

## 2020-06-18 LAB — SARS-COV-2 RNA RESP QL NAA+PROBE: NOT DETECTED

## 2020-06-23 ENCOUNTER — LAB REQUISITION (OUTPATIENT)
Dept: LAB | Facility: HOSPITAL | Age: 85
End: 2020-06-23
Payer: MEDICARE

## 2020-06-23 DIAGNOSIS — Z11.59 ENCOUNTER FOR SCREENING FOR OTHER VIRAL DISEASES: ICD-10-CM

## 2020-06-25 LAB — SARS-COV-2 RNA RESP QL NAA+PROBE: NOT DETECTED

## 2020-06-30 ENCOUNTER — NURSE ONLY (OUTPATIENT)
Dept: LAB | Age: 85
End: 2020-06-30
Attending: INTERNAL MEDICINE
Payer: MEDICARE

## 2020-06-30 DIAGNOSIS — J44.9 OBSTRUCTIVE CHRONIC BRONCHITIS WITHOUT EXACERBATION (HCC): ICD-10-CM

## 2020-06-30 DIAGNOSIS — R52 GENERALIZED PAIN: ICD-10-CM

## 2020-06-30 DIAGNOSIS — N39.0 URINARY TRACT INFECTION, SITE NOT SPECIFIED: Primary | ICD-10-CM

## 2020-06-30 DIAGNOSIS — I25.9 CHRONIC ISCHEMIC HEART DISEASE, UNSPECIFIED: ICD-10-CM

## 2020-06-30 LAB
ALBUMIN SERPL-MCNC: 3.1 G/DL (ref 3.4–5)
ALBUMIN/GLOB SERPL: 0.9 {RATIO} (ref 1–2)
ALP LIVER SERPL-CCNC: 65 U/L (ref 55–142)
ALT SERPL-CCNC: 14 U/L (ref 13–56)
ANION GAP SERPL CALC-SCNC: 5 MMOL/L (ref 0–18)
AST SERPL-CCNC: 19 U/L (ref 15–37)
BASOPHILS # BLD AUTO: 0.06 X10(3) UL (ref 0–0.2)
BASOPHILS NFR BLD AUTO: 1.2 %
BILIRUB SERPL-MCNC: 0.4 MG/DL (ref 0.1–2)
BUN BLD-MCNC: 35 MG/DL (ref 7–18)
BUN/CREAT SERPL: 24.6 (ref 10–20)
CALCIUM BLD-MCNC: 9.1 MG/DL (ref 8.5–10.1)
CHLORIDE SERPL-SCNC: 109 MMOL/L (ref 98–112)
CO2 SERPL-SCNC: 28 MMOL/L (ref 21–32)
CREAT BLD-MCNC: 1.42 MG/DL (ref 0.55–1.02)
DEPRECATED RDW RBC AUTO: 44.2 FL (ref 35.1–46.3)
EOSINOPHIL # BLD AUTO: 0.22 X10(3) UL (ref 0–0.7)
EOSINOPHIL NFR BLD AUTO: 4.6 %
ERYTHROCYTE [DISTWIDTH] IN BLOOD BY AUTOMATED COUNT: 12.2 % (ref 11–15)
GLOBULIN PLAS-MCNC: 3.6 G/DL (ref 2.8–4.4)
GLUCOSE BLD-MCNC: 94 MG/DL (ref 70–99)
HCT VFR BLD AUTO: 29.2 % (ref 35–48)
HGB BLD-MCNC: 9.6 G/DL (ref 12–16)
IMM GRANULOCYTES # BLD AUTO: 0.01 X10(3) UL (ref 0–1)
IMM GRANULOCYTES NFR BLD: 0.2 %
LYMPHOCYTES # BLD AUTO: 1.23 X10(3) UL (ref 1–4)
LYMPHOCYTES NFR BLD AUTO: 25.6 %
M PROTEIN MFR SERPL ELPH: 6.7 G/DL (ref 6.4–8.2)
MCH RBC QN AUTO: 32.5 PG (ref 26–34)
MCHC RBC AUTO-ENTMCNC: 32.9 G/DL (ref 31–37)
MCV RBC AUTO: 99 FL (ref 80–100)
MONOCYTES # BLD AUTO: 0.72 X10(3) UL (ref 0.1–1)
MONOCYTES NFR BLD AUTO: 15 %
NEUTROPHILS # BLD AUTO: 2.57 X10 (3) UL (ref 1.5–7.7)
NEUTROPHILS # BLD AUTO: 2.57 X10(3) UL (ref 1.5–7.7)
NEUTROPHILS NFR BLD AUTO: 53.4 %
OSMOLALITY SERPL CALC.SUM OF ELEC: 302 MOSM/KG (ref 275–295)
PATIENT FASTING Y/N/NP: YES
PLATELET # BLD AUTO: 227 10(3)UL (ref 150–450)
POTASSIUM SERPL-SCNC: 4.2 MMOL/L (ref 3.5–5.1)
RBC # BLD AUTO: 2.95 X10(6)UL (ref 3.8–5.3)
SODIUM SERPL-SCNC: 142 MMOL/L (ref 136–145)
WBC # BLD AUTO: 4.8 X10(3) UL (ref 4–11)

## 2020-06-30 PROCEDURE — 85025 COMPLETE CBC W/AUTO DIFF WBC: CPT

## 2020-06-30 PROCEDURE — 80053 COMPREHEN METABOLIC PANEL: CPT

## 2020-07-01 ENCOUNTER — LAB REQUISITION (OUTPATIENT)
Dept: LAB | Facility: HOSPITAL | Age: 85
End: 2020-07-01
Payer: COMMERCIAL

## 2020-07-01 DIAGNOSIS — Z11.59 ENCOUNTER FOR SCREENING FOR OTHER VIRAL DISEASES: ICD-10-CM

## 2020-07-03 LAB — SARS-COV-2 RNA RESP QL NAA+PROBE: NOT DETECTED

## 2020-07-26 PROBLEM — D63.1 ANEMIA DUE TO STAGE 3 CHRONIC KIDNEY DISEASE (HCC): Status: ACTIVE | Noted: 2018-07-29

## 2020-07-26 PROBLEM — N18.30 ANEMIA DUE TO STAGE 3 CHRONIC KIDNEY DISEASE (HCC): Status: ACTIVE | Noted: 2018-07-29

## 2020-07-26 PROBLEM — D63.1 ANEMIA DUE TO STAGE 3 CHRONIC KIDNEY DISEASE: Status: ACTIVE | Noted: 2018-07-29

## 2020-07-26 PROBLEM — N18.30 ANEMIA DUE TO STAGE 3 CHRONIC KIDNEY DISEASE: Status: ACTIVE | Noted: 2018-07-29

## 2020-09-30 ENCOUNTER — NURSE ONLY (OUTPATIENT)
Dept: LAB | Age: 85
End: 2020-09-30
Attending: INTERNAL MEDICINE
Payer: MEDICARE

## 2020-09-30 DIAGNOSIS — N39.0 URINARY TRACT INFECTION, SITE NOT SPECIFIED: Primary | ICD-10-CM

## 2020-09-30 DIAGNOSIS — J44.9 OBSTRUCTIVE CHRONIC BRONCHITIS WITHOUT EXACERBATION (HCC): ICD-10-CM

## 2020-09-30 DIAGNOSIS — I25.9 CHRONIC ISCHEMIC HEART DISEASE, UNSPECIFIED: ICD-10-CM

## 2020-09-30 DIAGNOSIS — R52 GENERALIZED PAIN: ICD-10-CM

## 2020-09-30 DIAGNOSIS — I10 ESSENTIAL HYPERTENSION, MALIGNANT: ICD-10-CM

## 2020-09-30 PROCEDURE — 85025 COMPLETE CBC W/AUTO DIFF WBC: CPT

## 2020-09-30 PROCEDURE — 80053 COMPREHEN METABOLIC PANEL: CPT

## 2020-10-01 ENCOUNTER — HOSPITAL ENCOUNTER (OUTPATIENT)
Facility: HOSPITAL | Age: 85
Setting detail: OBSERVATION
Discharge: NURSING, REHAB OR LONG-TERM CARE | End: 2020-10-02
Attending: EMERGENCY MEDICINE | Admitting: HOSPITALIST
Payer: MEDICARE

## 2020-10-01 ENCOUNTER — APPOINTMENT (OUTPATIENT)
Dept: CT IMAGING | Facility: HOSPITAL | Age: 85
End: 2020-10-01
Attending: EMERGENCY MEDICINE
Payer: MEDICARE

## 2020-10-01 ENCOUNTER — APPOINTMENT (OUTPATIENT)
Dept: GENERAL RADIOLOGY | Facility: HOSPITAL | Age: 85
End: 2020-10-01
Attending: EMERGENCY MEDICINE
Payer: MEDICARE

## 2020-10-01 DIAGNOSIS — G45.9 BRAIN TIA: Primary | ICD-10-CM

## 2020-10-01 DIAGNOSIS — D64.9 ANEMIA, UNSPECIFIED TYPE: ICD-10-CM

## 2020-10-01 DIAGNOSIS — R73.9 HYPERGLYCEMIA: ICD-10-CM
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
## 2020-10-01 PROCEDURE — 70450 CT HEAD/BRAIN W/O DYE: CPT | Performed by: EMERGENCY MEDICINE

## 2020-10-01 PROCEDURE — 71045 X-RAY EXAM CHEST 1 VIEW: CPT | Performed by: EMERGENCY MEDICINE

## 2020-10-01 PROCEDURE — 99220 INITIAL OBSERVATION CARE,LEVL III: CPT | Performed by: HOSPITALIST

## 2020-10-01 RX ORDER — ACETAMINOPHEN 650 MG/1
650 SUPPOSITORY RECTAL EVERY 4 HOURS PRN
Status: DISCONTINUED | OUTPATIENT
Start: 2020-10-01 | End: 2020-10-02

## 2020-10-01 RX ORDER — ONDANSETRON 2 MG/ML
4 INJECTION INTRAMUSCULAR; INTRAVENOUS EVERY 6 HOURS PRN
Status: DISCONTINUED | OUTPATIENT
Start: 2020-10-01 | End: 2020-10-02

## 2020-10-01 RX ORDER — HYDRALAZINE HYDROCHLORIDE 20 MG/ML
10 INJECTION INTRAMUSCULAR; INTRAVENOUS EVERY 2 HOUR PRN
Status: DISCONTINUED | OUTPATIENT
Start: 2020-10-01 | End: 2020-10-02

## 2020-10-01 RX ORDER — ACETAMINOPHEN 500 MG
500 TABLET ORAL EVERY 6 HOURS PRN
Status: DISCONTINUED | OUTPATIENT
Start: 2020-10-01 | End: 2020-10-01

## 2020-10-01 RX ORDER — SODIUM CHLORIDE 9 MG/ML
INJECTION, SOLUTION INTRAVENOUS CONTINUOUS
Status: DISCONTINUED | OUTPATIENT
Start: 2020-10-01 | End: 2020-10-02

## 2020-10-01 RX ORDER — ATORVASTATIN CALCIUM 40 MG/1
40 TABLET, FILM COATED ORAL NIGHTLY
Status: DISCONTINUED | OUTPATIENT
Start: 2020-10-01 | End: 2020-10-02

## 2020-10-01 RX ORDER — LABETALOL HYDROCHLORIDE 5 MG/ML
10 INJECTION, SOLUTION INTRAVENOUS EVERY 10 MIN PRN
Status: DISCONTINUED | OUTPATIENT
Start: 2020-10-01 | End: 2020-10-02

## 2020-10-01 RX ORDER — ASPIRIN 325 MG
325 TABLET ORAL DAILY
Status: DISCONTINUED | OUTPATIENT
Start: 2020-10-01 | End: 2020-10-02

## 2020-10-01 RX ORDER — HEPARIN SODIUM 5000 [USP'U]/ML
5000 INJECTION, SOLUTION INTRAVENOUS; SUBCUTANEOUS EVERY 8 HOURS SCHEDULED
Status: DISCONTINUED | OUTPATIENT
Start: 2020-10-01 | End: 2020-10-02

## 2020-10-01 RX ORDER — ASPIRIN 300 MG
300 SUPPOSITORY, RECTAL RECTAL DAILY
Status: DISCONTINUED | OUTPATIENT
Start: 2020-10-01 | End: 2020-10-02

## 2020-10-01 RX ORDER — ACETAMINOPHEN 325 MG/1
650 TABLET ORAL EVERY 4 HOURS PRN
Status: DISCONTINUED | OUTPATIENT
Start: 2020-10-01 | End: 2020-10-02

## 2020-10-01 RX ORDER — METOCLOPRAMIDE HYDROCHLORIDE 5 MG/ML
10 INJECTION INTRAMUSCULAR; INTRAVENOUS EVERY 8 HOURS PRN
Status: DISCONTINUED | OUTPATIENT
Start: 2020-10-01 | End: 2020-10-02

## 2020-10-01 RX ORDER — ENOXAPARIN SODIUM 100 MG/ML
40 INJECTION SUBCUTANEOUS DAILY
Status: DISCONTINUED | OUTPATIENT
Start: 2020-10-01 | End: 2020-10-01

## 2020-10-01 NOTE — ED INITIAL ASSESSMENT (HPI)
Pt presents via ems s/p brief episode of confusion this afternoon ems states pt fell out of bed this am but no transport request made at that time pt presents awake and alert with no complaints

## 2020-10-02 ENCOUNTER — APPOINTMENT (OUTPATIENT)
Dept: MRI IMAGING | Facility: HOSPITAL | Age: 85
End: 2020-10-02
Attending: HOSPITALIST
Payer: MEDICARE

## 2020-10-02 ENCOUNTER — TELEPHONE (OUTPATIENT)
Dept: NEUROLOGY | Facility: CLINIC | Age: 85
End: 2020-10-02

## 2020-10-02 ENCOUNTER — APPOINTMENT (OUTPATIENT)
Dept: CV DIAGNOSTICS | Facility: HOSPITAL | Age: 85
End: 2020-10-02
Attending: HOSPITALIST
Payer: MEDICARE

## 2020-10-02 VITALS
OXYGEN SATURATION: 98 % | RESPIRATION RATE: 18 BRPM | WEIGHT: 105 LBS | BODY MASS INDEX: 21 KG/M2 | SYSTOLIC BLOOD PRESSURE: 138 MMHG | HEART RATE: 84 BPM | DIASTOLIC BLOOD PRESSURE: 61 MMHG | TEMPERATURE: 98 F

## 2020-10-02 PROCEDURE — 99214 OFFICE O/P EST MOD 30 MIN: CPT | Performed by: OTHER

## 2020-10-02 PROCEDURE — 70551 MRI BRAIN STEM W/O DYE: CPT | Performed by: HOSPITALIST

## 2020-10-02 PROCEDURE — 99217 OBSERVATION CARE DISCHARGE: CPT | Performed by: HOSPITALIST

## 2020-10-02 PROCEDURE — 93306 TTE W/DOPPLER COMPLETE: CPT | Performed by: HOSPITALIST

## 2020-10-02 PROCEDURE — 70544 MR ANGIOGRAPHY HEAD W/O DYE: CPT | Performed by: HOSPITALIST

## 2020-10-02 PROCEDURE — 70547 MR ANGIOGRAPHY NECK W/O DYE: CPT | Performed by: HOSPITALIST

## 2020-10-02 RX ORDER — ASPIRIN 325 MG
325 TABLET ORAL DAILY
Qty: 30 TABLET | Refills: 0 | Status: SHIPPED | OUTPATIENT
Start: 2020-10-03 | End: 2020-10-22

## 2020-10-02 RX ORDER — ATORVASTATIN CALCIUM 40 MG/1
40 TABLET, FILM COATED ORAL NIGHTLY
Qty: 30 TABLET | Refills: 0 | Status: SHIPPED | OUTPATIENT
Start: 2020-10-02

## 2020-10-02 NOTE — PLAN OF CARE
Problem: Impaired Activities of Daily Living  Goal: Achieve highest/safest level of independence in self care  Description: Interventions:  - Assess ability and encourage patient to participate in ADLs to maximize function  - Promote sitting position [facility]

## 2020-10-02 NOTE — CONSULTS
[id number] Lulu Luna Neurology Initial Consultation    Lawence Dell Le Patient Status:  Observation    1930 MRN [id number]   [facility] 7NE-A Attending Reymundo Vora MD   Hosp Day # 0 PCP Chito Juarez MD     [address] c/w PVD left leg, Dr Cholo Sykes put her on Pletal.   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 MOUTH EVERY DAY, Disp: 90 tablet, Rfl: 3, 10/1/2020 at 0900    •  CALCIUM 600+D3 600-800 MG-UNIT Oral Tab, TAKE ONE TABLET BY MOUTH TWICE DAILY, Disp: 60 tablet, Rfl: 11, 10/1/2020 at 0900    •  [DISCONTINUED] ALLERGY RELIEF 180 MG Oral Tab, TAKE 1 TABLET is 21 and it is year 21 and born 21 yrs ago; knows she is in hospital but does not know name or city or where she lives    Further detailed cognitive testing unable to be completed     CN: PERRL, EOMI without nystagmus, VFF, smile with ?  Flattened nasolabi study.     Impressions: This study is compared with previous dated 01/23/2019:  Compared to the prior study, there has been no significant interval change.   *      10/1/2020 MRI Brain/MRA Brain and Neck  Chronic brain changes, without signs of acute infar

## 2020-10-02 NOTE — PLAN OF CARE
Patient drowsy this morning; confused; oriented to self; intermittently oriented to place   Telemetry Sinus Rhythm   Denies pain/discomfort   Patient confusion at baseline per daughter; no New acute focal neurological deficits  At times not cooperative wit activity level and precautions during self-care    Outcome: Progressing

## 2020-10-02 NOTE — PROGRESS NOTES
[facility]ICA HOSPITALIST  Progress Note     Jeana Le Patient Status:  Observation    1930 MRN [id number]   [facility] 7NE-A Attending Breana Schroeder MD   Hosp Day # 0 PCP Marc Schwartz MD     Chief Complaint: TIA    S: Patient love 168 hours. Creatinine Kinase  No results for input(s): CK in the last 168 hours. Inflammatory Markers  No results for input(s): CRP, ROSALVA, LDH, DDIMER in the last 168 hours. Imaging: Imaging data reviewed in Epic.     Medications:   • aspirin  300 m

## 2020-10-02 NOTE — PLAN OF CARE
NURSING DISCHARGE NOTE    Discharged Other, (see nursing note) via Ambulance. Accompanied by Support staff  Belongings Taken by patient/family. All consulting physicians cleared for discharge.  AVS Discharge paperwork completed and reviewed with natalya

## 2020-10-02 NOTE — CONSULTS
[id number] Lulu Luna Neurology Preliminary Evaluation    Jeana Le Patient Status:  Observation    1930 MRN [id number]   [facility] 7NE-A Attending Breana Schroeder MD   Hosp Day # 0 PCP Marc Schwartz MD     REASON FOR CONSULT HISTORY:  Past Surgical History:   Procedure Laterality Date   • BACK SURGERY  7/17/14    L3-L4, L4-L5 lami with PL fusion with poss inst and poss PLIF 7/17/14   • CATARACT Bilateral    • EYE VITRECTOMY WITH INDIRECT/ DIRECT LASER/ IRIDEX Right 5/2/2017 600+D3 600-800 MG-UNIT Oral Tab, TAKE ONE TABLET BY MOUTH TWICE DAILY, Disp: 60 tablet, Rfl: 11, 10/1/2020 at 0900        •  0.9% NaCl infusion, , Intravenous, Continuous    •  acetaminophen (TYLENOL) tab 650 mg, 650 mg, Oral, Q4H PRN    Or    •  acetamino 10/01/2020    .0 10/01/2020    CREATSERUM 1.36 10/02/2020    BUN 27 10/02/2020     10/02/2020    K 3.7 10/02/2020     10/02/2020    CO2 26.0 10/02/2020    GLU 89 10/02/2020    CA 9.6 10/02/2020    ALB 3.4 10/01/2020    ALKPHO 77 10/01/20

## 2020-10-02 NOTE — PLAN OF CARE
Alert & oriented x2. Disoriented to place & situation, Forgetful  Tele, NSR/ST  RA. Non-productive cough  Upon admission, pupils equal and reactive.   R pupil is now oval & sluggish, Dr. Benedict Moore aware  Unable to complete 0300 neuro assessment, Pt off th

## 2020-10-02 NOTE — H&P
[facility] HOSPITALIST  History and Physical     Jenny Le Patient Status:  Emergency    1930 MRN [id number]   Location [facility] Attending Jewel Ferrell MD   Hosp Day # 0 PCP Marjorie Donato MD     Chief Complaint right eye lens surgery - foreign body.   left eye cataract out w/ lens implant        Past Surgical History:   Past Surgical History:   Procedure Laterality Date   • BACK SURGERY  7/17/14    L3-L4, L4-L5 lami with PL fusion with poss inst and poss PLIF 7/17 TABLET BY MOUTH EVERY DAY, Disp: 90 tablet, Rfl: 3    •  ALLERGY RELIEF 180 MG Oral Tab, TAKE 1 TABLET BY MOUTH EVERY DAY, Disp: 90 tablet, Rfl: 3    •  Olmesartan Medoxomil 40 MG Oral Tab, Take 1 tablet (40 mg total) by mouth daily. , Disp: , Rfl:     •  C 0.3 0.6   TP 6.2* 7.7       Estimated Creatinine Clearance: 17.8 mL/min (A) (based on SCr of 1.51 mg/dL (H)). Recent Labs   Lab 10/01/20  1837   PTP 14.3   INR 1.08       No results for input(s): TROP, CK in the last 168 hours.     Imaging: Imaging data

## 2020-10-02 NOTE — SLP NOTE
ADULT SWALLOWING EVALUATION    ASSESSMENT    ASSESSMENT/OVERALL IMPRESSION:  Order received per stroke protocol. Pt admitted from longterm for slurred speech, confusion, and suspected TIA. Brain MRI negative for CVA.    Pt received awake, alert, and with lunch facility  Diet Prior to Admission: Regular; Thin liquids       Patient/Family Goals: \"I don't eat lunch usually\"    SWALLOWING HISTORY  Current Diet Consistency: Regular; Thin liquids     Dysphagia History: Denied however Pt did have VFSS in Jan 2019 with

## 2020-10-02 NOTE — PROGRESS NOTES
[facility] Pharm[facility] Note:  Renal Dose Adjustment for Enoxaparin (LOVENOX)    Rebecca Le has been prescribed Enoxaparin (LOVENOX) 40 mg subcutaneously every 24 hours. Estimated Creatinine Clearance: 17.8 mL/min (A) (based on SCr of 1.51 mg/dL (H)).     Calcul

## 2020-10-02 NOTE — PHYSICAL THERAPY NOTE
PHYSICAL THERAPY QUICK EVALUATION - INPATIENT    Room Number: 1743/8498-R  Evaluation Date: 10/2/2020  Presenting Problem: +fall, slurred speech  Physician Order: PT Eval and Treat    Pt was admitted from [facility] on 10/1/2020 with +fall, slurred spee Patient Owned Equipment: Rolling walker       Prior Level of [place]: Pt unable to give any history at this time. Per chart review, walks with RW. SUBJECTIVE  \"I don't want to go home. Leave me alone! \"    OBJECTIVE  Precautions: Bed/chair a putting self back to bed. Pt becoming increasingly agitated, swatting at writer at times and raising voice. Pt oriented to self only throughout session and unable to reorient or redirect.       Writer wore droplet mask, goggles, gown, gloves throughout en

## 2020-10-02 NOTE — TELEPHONE ENCOUNTER
TIA CLINIC SCREENING    1. Date of ED visit/TIA diagnosis:  10/01/2020   Time of discharge from ED:  adimitted    2. Is patient currently admitted? Yes   If YES - TIA Clinic Appointment not required.       3. Does patient already see an [facility] neurologist?  N

## 2020-10-02 NOTE — CM/SW NOTE
Per RN, patient ok to dc to [facility]. [address] requested, will  at 7 pm.  PCS form completed. Chris Rodarte at the [facility] notified. Patient to return to her room 154. RN to call report to [facility], [place] 5042.

## 2020-10-02 NOTE — ED PROVIDER NOTES
Patient Seen in: [facility] Emergency Department      History   Patient presents with:  Altered Mental Status    Stated Complaint: confusion     HPI    60-year-old female was sent to the emerge department secondary to slurred speech and confusion. Procedure Laterality Date   • BACK SURGERY  7/17/14    L3-L4, L4-L5 lami with PL fusion with poss inst and poss PLIF 7/17/14   • CATARACT Bilateral    • EYE VITRECTOMY WITH INDIRECT/ DIRECT LASER/ IRIDEX Right 5/2/2017    Performed by Angie Lovett MD a dry.      Capillary Refill: Capillary refill takes less than 2 seconds. Coloration: Skin is not pale. Neurological:      Mental Status: She is alert and oriented to person, place, and time. Mental status is at baseline.       Cranial Nerves: No crani Final result                 Please view results for these tests on the individual orders. RAINBOW DRAW BLUE   RAINBOW DRAW LAVENDER   RAINBOW DRAW LIGHT GREEN   RAINBOW DRAW GOLD     EKG    Rate, intervals and axes as noted on EKG Report.   Rate: 106 , XR, XR CHEST AP PORTABLE  (CPT=71045), 2/02/2019, 10:39 PM.  INDICATIONS:  confusion  PATIENT STATED HISTORY: (As transcribed by Technologist)  Pt presents via ems s/p brief episode of confusion this afternoon ems  states pt fell out of bed this am but n team  Admission disposition: 10/1/2020  8:18 PM                   Disposition and Plan     Clinical Impression:  Brain TIA  (primary encounter diagnosis)  Hyperglycemia  Fall, initial encounter  Anemia, unspecified type    Disposition:  Admit  10/1/2020  8

## 2020-10-03 NOTE — DISCHARGE SUMMARY
[facility] HOSPITALIST  DISCHARGE SUMMARY     Carroll Le Patient Status:  Observation    1930 MRN [id number]   [facility] 7NE-A Attending No att. providers found   Hosp Day # 0 PCP Tomas Luz MD     Date of Admission: 10/1/2020 Instructions Prescription details   aspirin 325 MG Tabs      Take 1 tablet (325 mg total) by mouth daily. Quantity: 30 tablet  Refills: 0     atorvastatin 40 MG Tabs  Commonly known as: LIPITOR      Take 1 tablet (40 mg total) by mouth nightly.    Teddy Looney GUERA  [address] [address]  [phone]    In 1 month  for neurology follow-up appointment     Appointments for Next 30 Days 10/3/2020 - 11/2/2020    None          Vital signs:  Temp:  [97.7 °F (36.5 °C)-98 °F (36.7 °C)] 98 °F (36.7 °C)  Pulse:  [70-84] 84

## 2020-10-06 PROBLEM — N18.32 STAGE 3B CHRONIC KIDNEY DISEASE (HCC): Status: ACTIVE | Noted: 2020-10-06

## 2020-11-04 RX ORDER — I-VITE, TAB 1000-60-2MG (60/BT) 300MCG-200
TAB ORAL
Qty: 90 TABLET | Refills: 3 | Status: SHIPPED | OUTPATIENT
Start: 2020-11-04 | End: 2021-04-07

## 2020-11-30 RX ORDER — DIPHENHYDRAMINE HCL 25 MG
TABLET,DISINTEGRATING ORAL
Qty: 60 TABLET | Refills: 11 | Status: SHIPPED | OUTPATIENT
Start: 2020-11-30

## 2021-01-29 DIAGNOSIS — Z23 NEED FOR VACCINATION: ICD-10-CM

## 2021-03-08 DIAGNOSIS — M54.16 LUMBAR RADICULAR PAIN: ICD-10-CM

## 2021-03-08 RX ORDER — AMOXICILLIN 500 MG
CAPSULE ORAL
Qty: 90 CAPSULE | Refills: 3 | Status: SHIPPED | OUTPATIENT
Start: 2021-03-08

## 2021-03-08 RX ORDER — UBIDECARENONE 75 MG
CAPSULE ORAL
Qty: 90 TABLET | Refills: 3 | Status: SHIPPED | OUTPATIENT
Start: 2021-03-08

## 2021-03-10 ENCOUNTER — NURSE ONLY (OUTPATIENT)
Dept: LAB | Age: 86
End: 2021-03-10
Attending: NURSE PRACTITIONER
Payer: MEDICARE

## 2021-03-10 DIAGNOSIS — N39.0 URINARY TRACT INFECTION, SITE NOT SPECIFIED: Primary | ICD-10-CM

## 2021-03-10 DIAGNOSIS — R52 GENERALIZED PAIN: ICD-10-CM

## 2021-03-10 DIAGNOSIS — I25.9 CHRONIC ISCHEMIC HEART DISEASE, UNSPECIFIED: ICD-10-CM

## 2021-03-10 DIAGNOSIS — M19.90 SENILE ARTHRITIS: ICD-10-CM

## 2021-03-10 LAB
ALBUMIN SERPL-MCNC: 2.9 G/DL (ref 3.4–5)
ALBUMIN/GLOB SERPL: 0.8 {RATIO} (ref 1–2)
ALP LIVER SERPL-CCNC: 96 U/L
ALT SERPL-CCNC: 22 U/L
ANION GAP SERPL CALC-SCNC: 5 MMOL/L (ref 0–18)
AST SERPL-CCNC: 28 U/L (ref 15–37)
BASOPHILS # BLD AUTO: 0.07 X10(3) UL (ref 0–0.2)
BASOPHILS NFR BLD AUTO: 1.2 %
BILIRUB SERPL-MCNC: 0.3 MG/DL (ref 0.1–2)
BUN BLD-MCNC: 23 MG/DL (ref 7–18)
BUN/CREAT SERPL: 18.3 (ref 10–20)
CALCIUM BLD-MCNC: 9.1 MG/DL (ref 8.5–10.1)
CHLORIDE SERPL-SCNC: 109 MMOL/L (ref 98–112)
CO2 SERPL-SCNC: 27 MMOL/L (ref 21–32)
CREAT BLD-MCNC: 1.26 MG/DL
DEPRECATED RDW RBC AUTO: 46.6 FL (ref 35.1–46.3)
EOSINOPHIL # BLD AUTO: 0.3 X10(3) UL (ref 0–0.7)
EOSINOPHIL NFR BLD AUTO: 5 %
ERYTHROCYTE [DISTWIDTH] IN BLOOD BY AUTOMATED COUNT: 12.9 % (ref 11–15)
GLOBULIN PLAS-MCNC: 3.5 G/DL (ref 2.8–4.4)
GLUCOSE BLD-MCNC: 74 MG/DL (ref 70–99)
HCT VFR BLD AUTO: 29.4 %
HGB BLD-MCNC: 9.8 G/DL
IMM GRANULOCYTES # BLD AUTO: 0.01 X10(3) UL (ref 0–1)
IMM GRANULOCYTES NFR BLD: 0.2 %
LYMPHOCYTES # BLD AUTO: 1.69 X10(3) UL (ref 1–4)
LYMPHOCYTES NFR BLD AUTO: 28 %
M PROTEIN MFR SERPL ELPH: 6.4 G/DL (ref 6.4–8.2)
MCH RBC QN AUTO: 33 PG (ref 26–34)
MCHC RBC AUTO-ENTMCNC: 33.3 G/DL (ref 31–37)
MCV RBC AUTO: 99 FL
MONOCYTES # BLD AUTO: 0.83 X10(3) UL (ref 0.1–1)
MONOCYTES NFR BLD AUTO: 13.7 %
NEUTROPHILS # BLD AUTO: 3.14 X10 (3) UL (ref 1.5–7.7)
NEUTROPHILS # BLD AUTO: 3.14 X10(3) UL (ref 1.5–7.7)
NEUTROPHILS NFR BLD AUTO: 51.9 %
OSMOLALITY SERPL CALC.SUM OF ELEC: 294 MOSM/KG (ref 275–295)
PATIENT FASTING Y/N/NP: YES
PLATELET # BLD AUTO: 206 10(3)UL (ref 150–450)
POTASSIUM SERPL-SCNC: 4.3 MMOL/L (ref 3.5–5.1)
RBC # BLD AUTO: 2.97 X10(6)UL
SODIUM SERPL-SCNC: 141 MMOL/L (ref 136–145)
WBC # BLD AUTO: 6 X10(3) UL (ref 4–11)

## 2021-03-10 PROCEDURE — 85025 COMPLETE CBC W/AUTO DIFF WBC: CPT

## 2021-03-10 PROCEDURE — 80053 COMPREHEN METABOLIC PANEL: CPT

## 2021-03-23 PROBLEM — N18.4 CKD (CHRONIC KIDNEY DISEASE) STAGE 4, GFR 15-29 ML/MIN (HCC): Status: ACTIVE | Noted: 2021-03-23

## 2021-03-23 RX ORDER — MULTIVIT-MIN/IRON FUM/FOLIC AC 7.5 MG-4
TABLET ORAL
Qty: 90 TABLET | Refills: 3 | Status: SHIPPED | OUTPATIENT
Start: 2021-03-23

## 2021-04-07 RX ORDER — I-VITE, TAB 1000-60-2MG (60/BT) 300MCG-200
TAB ORAL
Qty: 90 TABLET | Refills: 3 | Status: SHIPPED | OUTPATIENT
Start: 2021-04-07

## 2021-04-25 RX ORDER — FLUTICASONE FUROATE, UMECLIDINIUM BROMIDE AND VILANTEROL TRIFENATATE 100; 62.5; 25 UG/1; UG/1; UG/1
POWDER RESPIRATORY (INHALATION)
Qty: 1 EACH | Refills: 11 | Status: SHIPPED | OUTPATIENT
Start: 2021-04-25

## 2021-07-23 RX ORDER — PSEUDOEPHED/ACETAMINOPH/DIPHEN 30MG-500MG
TABLET ORAL
Qty: 60 TABLET | Refills: 0 | Status: SHIPPED | OUTPATIENT
Start: 2021-07-23

## 2021-12-08 ENCOUNTER — NURSE ONLY (OUTPATIENT)
Dept: LAB | Age: 86
End: 2021-12-08
Attending: NURSE PRACTITIONER
Payer: MEDICARE

## 2021-12-08 DIAGNOSIS — F02.80 ALZHEIMER'S DISEASE (HCC): ICD-10-CM

## 2021-12-08 DIAGNOSIS — G30.9 ALZHEIMER'S DISEASE (HCC): ICD-10-CM

## 2021-12-08 PROCEDURE — 82306 VITAMIN D 25 HYDROXY: CPT

## 2021-12-08 PROCEDURE — 82746 ASSAY OF FOLIC ACID SERUM: CPT

## 2021-12-08 PROCEDURE — 80061 LIPID PANEL: CPT

## 2021-12-08 PROCEDURE — 80053 COMPREHEN METABOLIC PANEL: CPT

## 2021-12-08 PROCEDURE — 82607 VITAMIN B-12: CPT

## 2021-12-08 PROCEDURE — 84443 ASSAY THYROID STIM HORMONE: CPT

## 2021-12-08 PROCEDURE — 85025 COMPLETE CBC W/AUTO DIFF WBC: CPT

## 2021-12-08 PROCEDURE — 83036 HEMOGLOBIN GLYCOSYLATED A1C: CPT

## 2022-07-28 PROBLEM — F01.50 VASCULAR DEMENTIA WITHOUT BEHAVIORAL DISTURBANCE (HCC): Status: ACTIVE | Noted: 2022-07-28

## 2022-10-13 ENCOUNTER — LAB REQUISITION (OUTPATIENT)
Dept: LAB | Facility: HOSPITAL | Age: 87
End: 2022-10-13
Attending: INTERNAL MEDICINE
Payer: MEDICARE

## 2022-10-13 DIAGNOSIS — F02.80 DEMENTIA IN OTHER DISEASES CLASSIFIED ELSEWHERE, UNSPECIFIED SEVERITY, WITHOUT BEHAVIORAL DISTURBANCE, PSYCHOTIC DISTURBANCE, MOOD DISTURBANCE, AND ANXIETY (HCC): ICD-10-CM

## 2022-10-13 LAB
ALBUMIN SERPL-MCNC: 3.3 G/DL (ref 3.4–5)
ALBUMIN/GLOB SERPL: 0.8 {RATIO} (ref 1–2)
ALP LIVER SERPL-CCNC: 71 U/L
ALT SERPL-CCNC: 15 U/L
ANION GAP SERPL CALC-SCNC: 7 MMOL/L (ref 0–18)
AST SERPL-CCNC: 21 U/L (ref 15–37)
BASOPHILS # BLD AUTO: 0.06 X10(3) UL (ref 0–0.2)
BASOPHILS NFR BLD AUTO: 0.9 %
BILIRUB SERPL-MCNC: 0.4 MG/DL (ref 0.1–2)
BUN BLD-MCNC: 40 MG/DL (ref 7–18)
CALCIUM BLD-MCNC: 9.6 MG/DL (ref 8.5–10.1)
CHLORIDE SERPL-SCNC: 110 MMOL/L (ref 98–112)
CO2 SERPL-SCNC: 24 MMOL/L (ref 21–32)
CREAT BLD-MCNC: 1.16 MG/DL
EOSINOPHIL # BLD AUTO: 0.37 X10(3) UL (ref 0–0.7)
EOSINOPHIL NFR BLD AUTO: 5.4 %
ERYTHROCYTE [DISTWIDTH] IN BLOOD BY AUTOMATED COUNT: 12.7 %
GFR SERPLBLD BASED ON 1.73 SQ M-ARVRAT: 44 ML/MIN/1.73M2 (ref 60–?)
GLOBULIN PLAS-MCNC: 4 G/DL (ref 2.8–4.4)
GLUCOSE BLD-MCNC: 79 MG/DL (ref 70–99)
HCT VFR BLD AUTO: 33.6 %
HGB BLD-MCNC: 11.4 G/DL
IMM GRANULOCYTES # BLD AUTO: 0.02 X10(3) UL (ref 0–1)
IMM GRANULOCYTES NFR BLD: 0.3 %
LYMPHOCYTES # BLD AUTO: 1.38 X10(3) UL (ref 1–4)
LYMPHOCYTES NFR BLD AUTO: 20.1 %
MCH RBC QN AUTO: 35.1 PG (ref 26–34)
MCHC RBC AUTO-ENTMCNC: 33.9 G/DL (ref 31–37)
MCV RBC AUTO: 103.4 FL
MONOCYTES # BLD AUTO: 0.82 X10(3) UL (ref 0.1–1)
MONOCYTES NFR BLD AUTO: 12 %
NEUTROPHILS # BLD AUTO: 4.2 X10 (3) UL (ref 1.5–7.7)
NEUTROPHILS # BLD AUTO: 4.2 X10(3) UL (ref 1.5–7.7)
NEUTROPHILS NFR BLD AUTO: 61.3 %
OSMOLALITY SERPL CALC.SUM OF ELEC: 301 MOSM/KG (ref 275–295)
PLATELET # BLD AUTO: 224 10(3)UL (ref 150–450)
POTASSIUM SERPL-SCNC: 4.3 MMOL/L (ref 3.5–5.1)
PROT SERPL-MCNC: 7.3 G/DL (ref 6.4–8.2)
RBC # BLD AUTO: 3.25 X10(6)UL
SODIUM SERPL-SCNC: 141 MMOL/L (ref 136–145)
WBC # BLD AUTO: 6.9 X10(3) UL (ref 4–11)

## 2022-10-13 PROCEDURE — 80053 COMPREHEN METABOLIC PANEL: CPT | Performed by: INTERNAL MEDICINE

## 2022-10-13 PROCEDURE — 85025 COMPLETE CBC W/AUTO DIFF WBC: CPT | Performed by: INTERNAL MEDICINE

## 2023-04-25 NOTE — OCCUPATIONAL THERAPY NOTE
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   left eye cataract out w/ lens implant       Past Surgical History  Past Surgical History:   P ACTIVITIES OF DAILY LIVING ASSESSMENT  AM-PAC ‘6-Clicks’ Inpatient Daily Activity Short Form  How much help from another person does the patient currently need…  -   Putting on and taking off regular lower body clothing?: Total  -   Bathing (incl impact the patient’s ability to participate in ADLs, instrumental activities of daily living, rest and sleep, work, leisure and social participation.      The patient is functioning below her previous functional level and would benefit from skilled inpatien Exercise Program Goal  Patient will be supervision with bilateral AROM HEP (home exercise program).     Additional Goals:  Visual perception screen when/if able Adjacent Tissue Transfer Text: The defect edges were debeveled with a #15 scalpel blade.  Given the location of the defect and the proximity to free margins an adjacent tissue transfer was deemed most appropriate.  Using a sterile surgical marker, an appropriate flap was drawn incorporating the defect and placing the expected incisions within the relaxed skin tension lines where possible.    The area thus outlined was incised deep to adipose tissue with a #15 scalpel blade.  The skin margins were undermined to an appropriate distance in all directions utilizing iris scissors.

## 2023-04-26 ENCOUNTER — APPOINTMENT (OUTPATIENT)
Dept: LAB | Age: 88
End: 2023-04-26
Attending: INTERNAL MEDICINE
Payer: MEDICARE

## 2023-08-18 ENCOUNTER — APPOINTMENT (OUTPATIENT)
Dept: CT IMAGING | Facility: HOSPITAL | Age: 88
End: 2023-08-18
Attending: EMERGENCY MEDICINE
Payer: MEDICARE

## 2023-08-18 ENCOUNTER — HOSPITAL ENCOUNTER (EMERGENCY)
Facility: HOSPITAL | Age: 88
Discharge: HOME | End: 2023-08-18
Attending: EMERGENCY MEDICINE
Payer: MEDICARE

## 2023-08-18 ENCOUNTER — APPOINTMENT (OUTPATIENT)
Dept: GENERAL RADIOLOGY | Facility: HOSPITAL | Age: 88
End: 2023-08-18
Attending: EMERGENCY MEDICINE
Payer: MEDICARE

## 2023-08-18 VITALS
SYSTOLIC BLOOD PRESSURE: 121 MMHG | HEART RATE: 74 BPM | HEIGHT: 65 IN | OXYGEN SATURATION: 92 % | BODY MASS INDEX: 16.66 KG/M2 | TEMPERATURE: 97 F | WEIGHT: 100 LBS | RESPIRATION RATE: 16 BRPM | DIASTOLIC BLOOD PRESSURE: 38 MMHG
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
## 2023-08-18 DIAGNOSIS — S01.01XA SCALP LACERATION, INITIAL ENCOUNTER: ICD-10-CM

## 2023-08-18 LAB
BILIRUB UR QL STRIP.AUTO: NEGATIVE
COLOR UR AUTO: YELLOW
GLUCOSE UR STRIP.AUTO-MCNC: NORMAL MG/DL
KETONES UR STRIP.AUTO-MCNC: NEGATIVE MG/DL
LEUKOCYTE ESTERASE UR QL STRIP.AUTO: 75
NITRITE UR QL STRIP.AUTO: NEGATIVE
PH UR STRIP.AUTO: 6 [PH] (ref 5–8)
PROT UR STRIP.AUTO-MCNC: 50 MG/DL
RBC #/AREA URNS AUTO: >10 /HPF
SP GR UR STRIP.AUTO: 1.01 (ref 1–1.03)
UROBILINOGEN UR STRIP.AUTO-MCNC: NORMAL MG/DL

## 2023-08-18 PROCEDURE — 36415 COLL VENOUS BLD VENIPUNCTURE: CPT

## 2023-08-18 PROCEDURE — 12002 RPR S/N/AX/GEN/TRNK2.6-7.5CM: CPT

## 2023-08-18 PROCEDURE — 71045 X-RAY EXAM CHEST 1 VIEW: CPT | Performed by: EMERGENCY MEDICINE

## 2023-08-18 PROCEDURE — 99285 EMERGENCY DEPT VISIT HI MDM: CPT

## 2023-08-18 PROCEDURE — 87086 URINE CULTURE/COLONY COUNT: CPT | Performed by: EMERGENCY MEDICINE

## 2023-08-18 PROCEDURE — 81001 URINALYSIS AUTO W/SCOPE: CPT | Performed by: EMERGENCY MEDICINE

## 2023-08-18 PROCEDURE — 99284 EMERGENCY DEPT VISIT MOD MDM: CPT

## 2023-08-18 PROCEDURE — 12002 RPR S/N/AX/GEN/TRNK2.6-7.5CM: CPT | Performed by: NURSE PRACTITIONER

## 2023-08-18 NOTE — DISCHARGE INSTRUCTIONS
Dahlia Dk will need to be removed in 7 days. Routine wound care. Fall prevention.   Return for any problems

## 2023-08-18 NOTE — ED QUICK NOTES
This RN spoke to the pt daughter, Avel Hernandez, regarding treatment here in the ED. Pt daughter does not want Cat Scanning done, pt daughter is ok with portable xray, straight cath for urine sample, and labs if needed. This RN will call the pt daughter with results.

## 2023-08-18 NOTE — ED PROVIDER NOTES
Procedure - Suture    UNIVERSAL PROTOCOL    - Verbal consent obtained by patient and/or guardian for wound closure. - Procedure / Risks / Benefits/ Alternatives discussed, with questions answered to satisfaction. ANESTHESIA  Method (topical, local, nerve block): local, 0.25% Bupivacaine    TREATMENT  Cleansed with: saline and betadine  Amount of cleaning: standard  Location: scalp, occiput  Length: approx 3cm  Repair method (sutures, staples, steri strips): staples  # Sutures / staples / steri strips: #4  Approximation (close, loose): close  Repair type (simple, complex): simple  Dressing: antibiotic ointment placed  Procedure Completion: Tolerated well without immediate complications      Ivan Ramirez, DNP, APRN, AGACNP-BC, FNP-C, CNL  Adult-Gerontology Acute Care & Family Nurse Practitioner

## 2023-08-18 NOTE — ED INITIAL ASSESSMENT (HPI)
Pt arrives to ED via EMS s/p unwitnessed fall in bathroom. Staff at the [facility] are unsure if the pt had LOC. Pt c/o of head pain and back pain.    Pt baseline is A&Ox1

## 2023-08-18 NOTE — ED QUICK NOTES
Phone call received from pt daughter, Renée Tavares stating, \"she's a dementia pt so you cannot proceed with treatment till you talk to me. \" This RN will call back the pt daughter back.      Renée Tavares [phone]

## (undated) DEVICE — 1 EACH 40411 STERILE DISPOSABLE SUPER VIEW® LENS SET & 1 EACH 40100 STERILE MICROSCOPE DRAPE: Brand: SUPER VIEW® PACK

## (undated) DEVICE — SHIELD EYE UNIVERSAL

## (undated) DEVICE — COVER,MAYO STAND,STERILE: Brand: MEDLINE

## (undated) DEVICE — VGFI TUBING SET: Brand: VGFI

## (undated) DEVICE — FRAGMATOME ACCESSORY PAK: Brand: FRAGMATOME, ALCON

## (undated) DEVICE — TOTAL PLUS 23 GA VITRECTOMY PAK FOR USE WITH: ACCURUS VITRECTOMY SYSTEM: Brand: TOTAL PLUS, ACCURUS, ALCON

## (undated) DEVICE — KENDALL SCD EXPRESS SLEEVES, KNEE LENGTH, MEDIUM: Brand: KENDALL SCD

## (undated) DEVICE — SUTURE POLYSORB 7-0 L-1787K

## (undated) DEVICE — EYE PACK: Brand: MEDLINE INDUSTRIES, INC.

## (undated) DEVICE — CATARACT PATIENT CARE KIT

## (undated) DEVICE — PREP BETADINE SOL 5% EYE

## (undated) DEVICE — APPLICATOR COTTON TIP 3 10/PK

## (undated) DEVICE — OCCUCOAT SYRINGE 1ML SINGLE: Brand: OCCUCOAT

## (undated) DEVICE — SOL H2O 1000ML BTL

## (undated) DEVICE — GLOVE SURG SENSICARE SZ 7

## (undated) DEVICE — Device

## (undated) DEVICE — STANDARD HYPODERMIC NEEDLE,POLYPROPYLENE HUB: Brand: MONOJECT

## (undated) DEVICE — MICROSURGICAL INSTRUMENT 23GA SOFT TIP NEEDLE: Brand: ALCON

## (undated) DEVICE — DISPOSABLE BIPOLAR PENCIL 5" (12.7CM) 23 GAUGE STRAIGHT: Brand: KIRWAN

## (undated) DEVICE — 3M(TM) TRANSPORE SURGICAL TAPE 1527-1: Brand: 3M™ TRANSPORE™

## (undated) NOTE — LETTER
April 13, 2018    Yaron Najera Mimi  [address] [address]      Dear Mark Anthony Bean: The following are the results of your recent tests. Please review the list of test results.   Your result is the value on the left; we have also suppl

## (undated) NOTE — IP AVS SNAPSHOT
[facility] [place] [address] [place], [address] ~ [phone]                Discharge Summary   5/2/2017    Desiree Le           Admission Information        Provider Department    5/2/2017 Flores Alejandro MD  [facility] / Elisabeth Segura OCUTABS-LUTEIN OR        Take  by mouth 2 (two) times daily.       [    ]    [    ]    [    ]    [    ]       ofloxacin 0.3 % Soln   Last time this was given:  1 drop on 5/2/2017  6:00 AM   Commonly known as:  Estela Macias           [    ]    [    ]    [    ] 1. If, in an emergency, you cannot reach Dr. Carla Link at the office or thru the answering service phone number, please use Dr. Enrique Andino direct pager number  [phone]    2. There will be occasional postoperative discomfort.  Take two tablets of Tylenol gloria ? Tub bath or shower with extreme care  ? Careful walking outdoors with  (Weather permitting)  ?  [place], If NO discomfort    ACTIVITIES THAT MAY BE RESUMED AFTER 1 WEEK:  ? Shampooing Hair (With head back, avoiding soap or water in operated eye: · Elevated Temperature  · Bleeding  · Nausea/Vomiting  · Pain not relieved with pain medication    For life threatening emergencies (unexpected chest pain, difficulty breathing) call 911.       Thank you for choosing [facility]  It was a pleasure taking care of coverage. Patient [address] is a Federal Navigator program that can help with your Affordable Care Act coverage, as well as all types of Medicaid plans.   To get signed up and covered, please call [phone] and ask to get set up for an insuran

## (undated) NOTE — LETTER
July 17, 2017    Francisco Osler Mimi  [address] [address]      Dear Isabel Short: The following are the results of your recent tests. Please review the list of test results.   Your result is the value on the left; we have also suppli

## (undated) NOTE — LETTER
March 28, 2018    Francisco Osler Mimi  [address] [facility] [address]      Dear Isabel Short: The following are the results of your recent tests. Please review the list of test results.   Your result is the value on the left; we have also suppl

## (undated) NOTE — LETTER
March 20, 2018    Chely Segrua Mimi  [address] [facility] [address]      Dear Austyn Mccauley: The following are the results of your recent tests ordered by Dr. Keith Rice. Please review the list of test results.   Your result is the number on the lef

## (undated) NOTE — LETTER
July 31, 2018    Gissell Addisonshannan Mimi  [address] [address]      Dear Jamie Ragsdale: The following are the results of your recent tests. Please review the list of test results.   Your result is the value on the left; we have also suppli

## (undated) NOTE — IP AVS SNAPSHOT
Patient Demographics     Address  [address] [place] [address] Phone  [phone] (Home) *Preferred*  [phone] [facility]) E-mail Address  [email or web address]      Emergency Contact(s)     Name Relation Home Work Mobile    Linda Abad Daughter 8 Take 1 tablet (40 mg total) by mouth nightly. Candy Cash MD         Calcium 600+D3 600-800 MG-UNIT Tabs  Generic drug: Calcium Carb-Cholecalciferol  Next dose due:  Tomorrow morning, 10/3/2020      TAKE ONE TABLET BY MOUTH TWICE DAILY   Stark Dubin [id number] Heparin Sodium (Porcine) 5000 UNIT/ML injection 5,000 Units 10/02/20 1415 Given            RIGHT LOWER ABDOMEN     Order ID Medication Name Action Time Action Reason Comments    [id number] Heparin Sodium (Porcine) 5000 UNIT/ML injection 5,000 Uni Reference interval for fasting triglycerides  Desirable: <150 mg/dL  Borderline: 150-199 mg/dL  High: 200-499 mg/dL  Very High: >=500 mg/dL         LDL Cholesterol 133 <100 mg/dL [facility])   Comment: Desirable <100 mg/dL   Borderline 100-129 mg/dL Component Value Reference Range Flag Lab   Glucose 89 70 - 99 mg/dL — [facility])   Sodium 140 136 - 145 mmol/L — [facility])   Potassium 3.7 3.5 - 5.1 mmol/L — [facility])   Chloride 109 98 - 112 mmol/L — [facility])   CO2 26.0 21.0 - Component Value Reference Range Flag Lab   Hold PERSON [facility] Resulted — — [facility])            Gaby Fisher [[id number]]  Resulted: 10/01/20 2000, Result status: Final result   Ordering provider: Brandt Reyes MD  10/01/20 2733 Resulting lab: Anion Gap 9 0 - 18 mmol/L — [facility])   BUN 26 7 - 18 mg/dL [facility])   Creatinine 1.51 0.55 - 1.02 mg/dL [facility])   BUN/CREA Ratio 17.2 10.0 - 20.0 — [facility])   Calcium, Total 10.5 8.5 - 10.1 mg/dL [facility])   Ca Bacteria Urine None Seen None Seen — [facility])   Squamous Epi.  Cells None Seen Small /LPF — [facility])   Renal Tubular Epithelial Cells None Seen Small /LPF — [facility])   Transitional Cells None Seen Small /LPF — [facility])   Ca O Blood — 10/01/20 1837          Components    Component Value Reference Range Flag Lab   PTT 33.1 25.4 - 36.1 seconds — [facility])   Comment:        The aPTT Heparin Therapeutic Range is approximately 65- 104 seconds.  The therapeutic range has been va History of Present Illness: Dell Richardson is a 80year old female with medical history of CAD, COPD, essential hypertension and peripheral vascular disease who comes to the ER for evaluation of slurred speech and confusion.   She lives in an assisted living • CATARACT Bilateral    • EYE VITRECTOMY WITH INDIRECT/ DIRECT LASER/ IRIDEX Right 5/2/2017    Performed by Lizzie Navarrete MD at [facility] MAIN OR   • REVISE THUMB [address] Right    • TONSILLECTOMY         Social History:  reports that she quit smoking about 2 •  CALCIUM 600+D3 600-800 MG-UNIT Oral Tab, TAKE ONE TABLET BY MOUTH TWICE DAILY, Disp: 60 tablet, Rfl: 11    •  Polyethylene Glycol 400 (BLINK TEARS) 0.25 % Ophthalmic Gel, Apply 1 drop to eye 2 (two) times daily. , Disp: 10 mL, Rfl: 0        Review of Sys Imaging: Imaging data reviewed in Epic. ASSESSMENT / PLAN:     1. Slurred speech and confusion  1. Resolved  2. ? TIA  3. Ct negative  4. R/o infection  5. Stroke w/u  2. Acute renal insuff/ CKD  1. IVF  3. Leukocytosis  1. UA and CXR negative  2.  fol Patient[KB. 1] states she does not recall all of the information of yesterday but recalls tripping while going to bed and sliding to the ground. She is not a reliable historian and[KB. 3] information obtained from chart and staff.   Patient lives at [facility] • BACK SURGERY  7/17/14    L3-L4, L4-L5 lami with PL fusion with poss inst and poss PLIF 7/17/14   • CATARACT Bilateral    • EYE VITRECTOMY WITH INDIRECT/ DIRECT LASER/ IRIDEX Right 5/2/2017    Performed by Carollee Boast, MD at [address] [place] [address] •  [DISCONTINUED] Olmesartan Medoxomil 40 MG Oral Tab, Take 1 tablet (40 mg total) by mouth daily. , Disp: , Rfl: , 10/1/2020 at 0900        •  acetaminophen (TYLENOL) tab 650 mg, 650 mg, Oral, Q4H PRN    Or    •  acetaminophen (TYLENOL) 650 MG rectal suppo CN: PERRL, EOMI without nystagmus, VFF, smile with ?  Flattened nasolabial fold on L, sensation intact, tongue and palate midline, SCM intact; otherwise, 2-12 intact  Motor: 5/5 strength throughout UE and 4/5 in bilateral LE proximally but no drift; no foca 10/1/2020 MRI Brain/MRA Brain and Neck  Chronic brain changes, without signs of acute infarct. Stenosis probably greater than 50% involving the right carotid bulb extending into proximal right ICA.   Assessment of more definitive quantitative stenosis larissa : Tae Mac, PT (Physical Therapist)         PHYSICAL THERAPY QUICK EVALUATION - INPATIENT    Room Number: 2766/3809-I  Evaluation Date: 10/2/2020  Presenting Problem: +fall, slurred speech  Physician Order: PT Eval and Treat    Pt was admitte Home Layout: One level                Lives With: Staff 24 hours     Patient Owned Equipment: Rolling walker       Prior Level of [place]: Pt unable to give any history at this time. Per chart review, walks with RW.       SUBJECTIVE  \"I don't want to Skilled Therapy Provided: Pt received supine in bed. Pt able to open eyes to voice and touch however un-agreeable initially to keep eyes open. Pt startled by each verbal and tactile interaction.   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   • COPD (chronic obstructive pulmonary disease) ([facility])    • Essential hypertension, benign    • Extrinsic asthma, unspecified    • Hearing impairment     [place] no hearing aids   • Hi PERCEPTION  NT    SENSATION  NT    Communication: WFL    Behavioral/Emotional/Social: irritable    RANGE OF MOTION AND STRENGTH ASSESSMENT  Upper extremity ROM ; elbows wrist fingers appear WF:    Upper extremity strength is within functional limits no obv Patient End of Session: In bed;Needs met;Call light within reach;RN aware of session/findings; All patient questions and concerns addressed;SCDs in place; Alarm set    ASSESSMENT     Patient is a 80year old female admitted on 10/1/2020 for[MM.1] confusion, OT Treatment Plan: Balance activities; Energy conservation/work simplification techniques;ADL training;Visual perceptual training;Functional transfer training;UE strengthening/ROM; Endurance training;Cognitive reorientation;Patient/Family education;Patient/F Diet Recommendations - Solids: Regular  Diet Recommendations - Liquid:  Thin  Aspiration Precautions: Upright position  Medication Administration Recommendations: No restrictions  Treatment Plan/Recommendations: No further inpatient SLP service warranted  D Chronic brain changes, without signs of acute infarct. Stenosis probably greater than 50% involving the right carotid bulb extending into proximal right ICA.   Assessment of more definitive quantitative stenosis measurement is limited on this MRA, suggest INFLUENZA 10/05/12     Pneumococcal (Prevnar 13) 01/07/15       Multidisciplinary Problems     Active Goals        Problem: Patient/Family Goals    Goal Priority Disciplines Outcome Interventions   Patient/Family Long Term Goal     Interdisciplinary Progr

## (undated) NOTE — MR AVS SNAPSHOT
[facility]  [address] [place] [place] [address] [phone]               Thank you for choosing us for your health care visit with Kenneth Garcia MD.  We are glad to serve you and happy to provide you with this Today's Vital Signs     BP Pulse Height Weight BMI    140/60 mmHg 72 59.54\" 119 lb 12.8 oz 23.77 kg/m2         Current Medications          This list is accurate as of: 1/31/17  2:41 PM.  Always use your most recent med list.                Albuterol Sulf Assoc Dx:  Essential hypertension [I10]                 Follow-up Instructions     Return in about 6 months (around 7/31/2017). Results of Recent Testing       [facility]                  Visit [employer] online at  [email or web address]. or

## (undated) NOTE — IP AVS SNAPSHOT
[address]            (For Outpatient Use Only) Initial Admit Date: 10/1/2020   Inpt/Obs Admit Date: Inpt: N/A / Obs: 10/01/20   Discharge Date:    Sona Macedo:  [de-identified]   MRN: [de-identified]   CSN: [id number]   CEID: [id number] Group Number:  Insurance Type:    Subscriber Name:  Subscriber :    Subscriber ID:  Pt Rel to Subscriber:    Hospital Account Financial Class: Medicare    2020

## (undated) NOTE — ED AVS SNAPSHOT
Nick Le   MRN: [id number]    Department:  [facility] Emergency Department   Date of Visit:  2/2/2019           Disclosure     Insurance plans vary and the physician(s) referred by the ER may not be covered by your plan.  Please contact your i tell this physician (or your personal doctor if your instructions are to return to your personal doctor) about any new or lasting problems. The primary care or specialist physician will see patients referred from the [facility] Emergency Department.  Merlin Bar

## (undated) NOTE — IP AVS SNAPSHOT
[address]            (For Outpatient Use Only) Initial Admit Date: 1/22/2019   Inpt/Obs Admit Date: Inpt: 1/23/19 / Obs: N/A   Discharge Date:    Carylon Kawasaki:  [de-identified]   MRN: [de-identified]   CSN: [id number]        ENCOUNTER  Patient Subscriber Name:  Jonathan Bill :    Subscriber ID:  Pt Rel to Subscriber:    Hospital Account Financial Class: Medicare    2019

## (undated) NOTE — IP AVS SNAPSHOT
Patient Demographics     Address  [address]    [place] [address] Phone  [phone] (Home) *Preferred*  [phone] [facility]) E-mail Address  [email or web address]      Emergency Contact(s)     Name Relation Home Work Mobile    JOSÉ Abad Deloris Madrid MD         Cefuroxime Axetil 250 MG Tabs  Commonly known as:  CEFTIN  Next dose due:  1/27/2019      Take 1 tablet (250 mg total) by mouth every 12 (twelve) hours for 2 days.   Stop taking on:  1/29/2019   Deloris Madrid MD         [employer] or nurse    Bring a paper prescription for each of these medications  Cefuroxime Axetil 250 MG Tabs           [id number] - MAR ACTION REPORT  (last 24 hrs)    ** SITE UNKNOWN **     Order ID Medication Name Action Time Action Reason Comments    [id number] Rosio Watkins [id number] 02/03/16 1201 - Present            Microbiology Results (All)     Procedure Component Value Units Date/Time    Urine Culture, Routine Once [[id number]]  (Abnormal)  (Susceptibility) Collected:  01/25/19 0355    Order Status:  Completed L how long she was on the floor. [OO.2]    Past Medical History:  Past Medical History:   Diagnosis Date   • Anesthesia complication     NEEDED O2 DURING RECOVERY    • Arrhythmia     controlled   • Back problem     L4-5 surgery   • CAD (coronary artery diseas Medications:    No current facility-administered medications on file prior to encounter.    Current Outpatient Medications on File Prior to Encounter:  Tiotropium Bromide Monohydrate (SPIRIVA HANDIHALER) 18 MCG Inhalation Cap Inhale the contents of one caps General: No acute distress. Alert and oriented x 3. HEENT: Normocephalic atraumatic. Moist mucous membranes. EOM-I. PERRLA. Anicteric. Neck: No lymphadenopathy. No JVD. No carotid bruits. Respiratory: Clear to auscultation bilaterally. No wheezes.  No rh Plan of care discussed with[OO.1] patient and ED physician[OO.2]    Adarsh Gates MD  1/23/2019[OO. 1]                        Electronically signed by Henry Chester MD on 1/23/2019  2:31 AM   Attribution Castillo    OO. 1 - Henry Chester MD on 1/23/2019  1:27 AM  OO. • COPD (chronic obstructive pulmonary disease) (HCC)    • Essential hypertension, benign    • Extrinsic asthma, unspecified    • Hearing impairment     [place] no hearing aids   • High blood pressure    • Osteoarthritis    • Pain in shoulder    • Pulmonary emp Mometasone Furo-Formoterol Fum (DULERA) 200-5 MCG/ACT Inhalation Aerosol Inhale 2 puffs into the lungs 2 (two) times daily. Disp: 39 g Rfl: 3 Taking   Olmesartan Medoxomil 20 MG Oral Tab Take 1 tablet (20 mg total) by mouth daily.  Disp: 90 tablet Rfl: 3 Ta morphINE sulfate (PF) 4 MG/ML injection 1 mg 1 mg Intravenous Q2H PRN   Or      morphINE sulfate (PF) 4 MG/ML injection 2 mg 2 mg Intravenous Q2H PRN   Senna (SENOKOT) tab TABS 17.2 mg 17.2 mg Oral Nightly   docusate sodium (COLACE) cap 100 mg 100 mg Oral  01/23/2019    K 3.2 01/23/2019     01/23/2019    CO2 26.0 01/23/2019    GLU 99 01/23/2019    CA 9.3 01/23/2019    ALB 3.6 01/22/2019    ALKPHO 70 01/22/2019    BILT 1.1 01/22/2019    TP 7.9 01/22/2019    AST 43 01/23/2019    ALT 32 01/22/2019 Electronically signed by Bev Khan MD on 1/23/2019  5:20 PM   Attribution Castillo    DS. 1 - Bev Khan MD on 1/23/2019  5:19 PM  MO.1 - JIMENA Tran on 1/23/2019  1:36 PM                     D/C Summary     No notes of this type exist for this vasc screening c/w PVD left leg, Dr Sachin Hernandez put her on Pletal.   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 ..  -   Turning over in bed (including adjusting bedclothes, sheets and blankets)?: A Little   -   Sitting down on and sta include[EP.1] COPD, CAD, HTN, PVD[EP.2].   In this PT evaluation, the patient presents with the following impairments[EP.1] decreased static and dynamic standing balance, gait dysfunction, cognitive impairments including decreased processing, attention, mem Occupational Therapy Notes (last 72 hours) (Notes from 1/24/2019 12:17 PM through 1/27/2019 12:17 PM)      Occupational Therapy Note signed by Ivon Alexandra OT at 1/24/2019  1:42 PM  Version 1 of 1    Author:  Ivon Alexandra OT Service:  Mendel Murphy • Essential hypertension, benign    • Extrinsic asthma, unspecified    • Hearing impairment     [place] no hearing aids   • High blood pressure    • Osteoarthritis    • Pain in shoulder    • Pulmonary emphysema (HCC)    • PVD (peripheral vascular disease) ([facility] [address]. Initiation: cues to initiate tasks  Safety Judgement:  decreased awareness of need for assistance and decreased awareness of need for safety  Awareness of Errors:  decreased awareness of errors   Awareness of Deficits:  not aware of deficits  Problem Nitza Marsh Skilled Therapy Provided: Supervision supine to sit. Pt appeared to be distracted with cardiac monitor lines. Pt required 3 cueing to comprehend that those lines needed to stay attached to her chest.  When scooting, pt required cueing to slow down.   SBP 14 benefit from skilled inpatient OT to address the above deficits, maximizing patient’s ability to return safely to her prior level of function.     Patient Complexity  Occupational Profile/Medical History LOW - Brief history including review of medical or th Filed:  1/24/2019 11:39 AM Date of Service:  1/24/2019 11:31 AM Status:  Signed    :  Omar Elizabeth SLP (SPEECH-LANGUAGE PATHOLOGIST)       ADULT VIDEOFLUOROSCOPIC SWALLOWING STUDY    Admission Date: 1/22/2019  Evaluation Date: 01/24/19  Radiologist: Current Diet Consistency: NPO  Prior Level of Function: Independent  Prior Living Situation: Independent living  History of Recent: No recent respiratory difficulty  Precautions: Seizure    Reason for Referral: Stroke protocol;RN dysphagia screen    Family Effectiveness: Yes  Penetration Aspiration Scale Score: Score 6: Material enters the airway, passes below the vocal folds, and is ejected into the larynx or out of the airway       Overall Impression: Pt presents with minimal oropharyngeal dysphagia sarah Patient Experiencing Pain: No    FOLLOW UP  Treatment Plan/Recommendations: Dysphagia therapy;Communication evaluation  Number of Visits to Meet Established Goals: 2    Thank you for your referral.   If you have any questions, please contact Kevon Edwards Treatment Plan/Recommendations: Videofluoroscopic swallow study;Communication evaluation  Discharge Recommendations/Plan: Undetermined    HISTORY   MEDICAL HISTORY  Reason for Referral: Stroke protocol;RN dysphagia screen    Problem List  Principal Problem Strength: Within Functional Limits  Tone: Within Functional Limits  Range of Motion: Within Functional Limits  Rate of Motion: Reduced    Voice Quality: Clear  Respiratory Status: Unlabored  Consistencies Trialed:  Thin liquids;Puree;Hard solid  Method of P 2/6/2019 1:30 PM Nia Sharpe MD SP CARDIOLOGY [facility]

## (undated) NOTE — Clinical Note
February 1, 2017    [facility]  [address] [place] [address]      Dear Lindy Lara: The following are the results of your recent tests. Please review the list of test results.   Your result is the value on the left; we have also sup

## (undated) NOTE — ED AVS SNAPSHOT
Robert Le   MRN: [id number]    Department:  [facility] Emergency Department   Date of Visit:  12/30/2018           Disclosure     Insurance plans vary and the physician(s) referred by the ER may not be covered by your plan.  Please contact your tell this physician (or your personal doctor if your instructions are to return to your personal doctor) about any new or lasting problems. The primary care or specialist physician will see patients referred from the [facility] Emergency Department.  Amaury Hancock

## (undated) NOTE — MR AVS SNAPSHOT
[facility]  [address] [place] Virgen [address] [place] [id number] [phone]               Thank you for choosing us for your health care visit with Shaila Arango MD.  We are glad to serve you and happy to provide you with this BP Pulse Temp Weight          130/70 mmHg 76 98.5 °F (36.9 °C) (Tympanic) 118 lb 12.8 oz           Current Medications          This list is accurate as of: 3/20/17  3:29 PM.  Always use your most recent med list.                Albuterol Sulfate  your doctor or other care provider to review them with you.             [place]                  Visit [facility] online at  [email or web address]